# Patient Record
Sex: MALE | Race: WHITE | Employment: OTHER | URBAN - METROPOLITAN AREA
[De-identification: names, ages, dates, MRNs, and addresses within clinical notes are randomized per-mention and may not be internally consistent; named-entity substitution may affect disease eponyms.]

---

## 2017-06-09 ENCOUNTER — OFFICE VISIT (OUTPATIENT)
Dept: ORTHOPEDIC SURGERY | Age: 72
End: 2017-06-09

## 2017-06-09 DIAGNOSIS — M25.562 LEFT KNEE PAIN, UNSPECIFIED CHRONICITY: Primary | ICD-10-CM

## 2017-06-09 DIAGNOSIS — M17.12 PRIMARY OSTEOARTHRITIS OF LEFT KNEE: ICD-10-CM

## 2017-06-09 PROCEDURE — 73564 X-RAY EXAM KNEE 4 OR MORE: CPT | Performed by: PHYSICIAN ASSISTANT

## 2017-06-09 PROCEDURE — 99203 OFFICE O/P NEW LOW 30 MIN: CPT | Performed by: PHYSICIAN ASSISTANT

## 2017-06-26 ENCOUNTER — OFFICE VISIT (OUTPATIENT)
Dept: ORTHOPEDIC SURGERY | Age: 72
End: 2017-06-26

## 2017-06-26 VITALS
BODY MASS INDEX: 27.26 KG/M2 | HEIGHT: 68 IN | WEIGHT: 179.9 LBS | DIASTOLIC BLOOD PRESSURE: 73 MMHG | SYSTOLIC BLOOD PRESSURE: 103 MMHG | HEART RATE: 83 BPM

## 2017-06-26 DIAGNOSIS — M25.562 LEFT KNEE PAIN, UNSPECIFIED CHRONICITY: Primary | ICD-10-CM

## 2017-06-26 PROCEDURE — 99214 OFFICE O/P EST MOD 30 MIN: CPT | Performed by: ORTHOPAEDIC SURGERY

## 2017-06-26 RX ORDER — ROSUVASTATIN CALCIUM 40 MG/1
40 TABLET, COATED ORAL
COMMUNITY
Start: 2015-04-14

## 2018-11-16 ENCOUNTER — HOSPITAL ENCOUNTER (OUTPATIENT)
Age: 73
Discharge: HOME | End: 2018-11-16
Payer: MEDICARE

## 2018-11-16 ENCOUNTER — HOSPITAL ENCOUNTER (OUTPATIENT)
Dept: GENERAL RADIOLOGY | Age: 73
Discharge: HOME | End: 2018-11-16
Payer: MEDICARE

## 2018-11-16 DIAGNOSIS — M54.5 LOW BACK PAIN, UNSPECIFIED BACK PAIN LATERALITY, UNSPECIFIED CHRONICITY, WITH SCIATICA PRESENCE UNSPECIFIED: ICD-10-CM

## 2018-11-16 DIAGNOSIS — M25.551 RIGHT HIP PAIN: ICD-10-CM

## 2018-11-16 PROCEDURE — 72100 X-RAY EXAM L-S SPINE 2/3 VWS: CPT

## 2018-11-16 PROCEDURE — 73502 X-RAY EXAM HIP UNI 2-3 VIEWS: CPT

## 2024-11-15 ENCOUNTER — TELEPHONE (OUTPATIENT)
Dept: INTERVENTIONAL RADIOLOGY/VASCULAR | Age: 79
End: 2024-11-15

## 2024-11-15 NOTE — TELEPHONE ENCOUNTER
Called and spoke to Nichole about upcoming procedure. Phone number used: [phone]  Procedure: tunneled hemodialysis catheter placement  Approving Radiologist: not needed    Pt informed of the following:  Date of procedure: 11/21/24  Arrival time of procedure: 0930  Time of procedure: 1130    On any blood thinners?Yes. If yes: Pradaxa  Instructed to hold thinners for 0 days prior to test    On any GLP-1 Agonists? ( Ozempic, Jardiance, Semaglutide) Yes. Instructed to hold for 7 days.     Blood pressure medication? No. If yes need to make sure take morning of procedure.     Any issues with sedation in the past? no  Will receive versed and fentanyl for sedation will need a  day of procedure.     H&P or office note within 30 days? no    After procedure will be here 2-4 hours after procedure for monitoring.     Nothing to eat or drink at midnight.     Need COVID testing prior: No    Need SDS: Yes    Procedure: tunneled hemodialysis catheter removal  Approving Radiologist: not needed    Pt informed of the following:  Date of procedure: 12/20/24  Arrival time of procedure: 1230  Time of procedure: 1300      Need SDS: No

## 2024-11-21 ENCOUNTER — HOSPITAL ENCOUNTER (OUTPATIENT)
Dept: INTERVENTIONAL RADIOLOGY/VASCULAR | Age: 79
Discharge: HOME | End: 2024-11-21
Attending: UROLOGY
Payer: MEDICARE

## 2024-11-21 VITALS
DIASTOLIC BLOOD PRESSURE: 69 MMHG | OXYGEN SATURATION: 93 % | RESPIRATION RATE: 16 BRPM | HEART RATE: 64 BPM | TEMPERATURE: 97.7 F | SYSTOLIC BLOOD PRESSURE: 109 MMHG

## 2024-11-21 DIAGNOSIS — C61 MALIGNANT NEOPLASM OF PROSTATE (HCC): ICD-10-CM

## 2024-11-21 LAB
ANION GAP SERPL CALCULATED.3IONS-SCNC: 14 MMOL/L (ref 3–16)
BASOPHILS # BLD: 0.1 K/UL (ref 0–0.2)
BASOPHILS NFR BLD: 0.7 %
BUN SERPL-MCNC: 11 MG/DL (ref 7–20)
CALCIUM SERPL-MCNC: 9.7 MG/DL (ref 8.3–10.6)
CHLORIDE SERPL-SCNC: 105 MMOL/L (ref 99–110)
CO2 SERPL-SCNC: 21 MMOL/L (ref 21–32)
CREAT SERPL-MCNC: 0.7 MG/DL (ref 0.8–1.3)
DEPRECATED RDW RBC AUTO: 13.2 % (ref 12.4–15.4)
EOSINOPHIL # BLD: 0.3 K/UL (ref 0–0.6)
EOSINOPHIL NFR BLD: 3.1 %
GFR SERPLBLD CREATININE-BSD FMLA CKD-EPI: >90 ML/MIN/{1.73_M2}
GLUCOSE SERPL-MCNC: 129 MG/DL (ref 70–99)
HCT VFR BLD AUTO: 45.1 % (ref 40.5–52.5)
HGB BLD-MCNC: 15.4 G/DL (ref 13.5–17.5)
INR PPP: 1.03 (ref 0.85–1.15)
LYMPHOCYTES # BLD: 2 K/UL (ref 1–5.1)
LYMPHOCYTES NFR BLD: 21.9 %
MCH RBC QN AUTO: 30.6 PG (ref 26–34)
MCHC RBC AUTO-ENTMCNC: 34.2 G/DL (ref 31–36)
MCV RBC AUTO: 89.3 FL (ref 80–100)
MONOCYTES # BLD: 0.8 K/UL (ref 0–1.3)
MONOCYTES NFR BLD: 8.6 %
NEUTROPHILS # BLD: 6 K/UL (ref 1.7–7.7)
NEUTROPHILS NFR BLD: 65.7 %
PLATELET # BLD AUTO: 218 K/UL (ref 135–450)
PMV BLD AUTO: 7.7 FL (ref 5–10.5)
POTASSIUM SERPL-SCNC: 3.8 MMOL/L (ref 3.5–5.1)
PROTHROMBIN TIME: 13.7 SEC (ref 11.9–14.9)
RBC # BLD AUTO: 5.04 M/UL (ref 4.2–5.9)
SODIUM SERPL-SCNC: 140 MMOL/L (ref 136–145)
WBC # BLD AUTO: 9.1 K/UL (ref 4–11)

## 2024-11-21 PROCEDURE — 99152 MOD SED SAME PHYS/QHP 5/>YRS: CPT

## 2024-11-21 PROCEDURE — 85025 COMPLETE CBC W/AUTO DIFF WBC: CPT

## 2024-11-21 PROCEDURE — 2500000003 HC RX 250 WO HCPCS: Performed by: RADIOLOGY

## 2024-11-21 PROCEDURE — 76937 US GUIDE VASCULAR ACCESS: CPT

## 2024-11-21 PROCEDURE — 85610 PROTHROMBIN TIME: CPT

## 2024-11-21 PROCEDURE — 36558 INSERT TUNNELED CV CATH: CPT

## 2024-11-21 PROCEDURE — 2580000003 HC RX 258: Performed by: RADIOLOGY

## 2024-11-21 PROCEDURE — 77001 FLUOROGUIDE FOR VEIN DEVICE: CPT

## 2024-11-21 PROCEDURE — 36415 COLL VENOUS BLD VENIPUNCTURE: CPT

## 2024-11-21 PROCEDURE — 6360000002 HC RX W HCPCS: Performed by: RADIOLOGY

## 2024-11-21 PROCEDURE — C1750 CATH, HEMODIALYSIS,LONG-TERM: HCPCS

## 2024-11-21 PROCEDURE — 80048 BASIC METABOLIC PNL TOTAL CA: CPT

## 2024-11-21 RX ORDER — SODIUM CHLORIDE 0.9 % (FLUSH) 0.9 %
5-40 SYRINGE (ML) INJECTION PRN
Status: DISCONTINUED | OUTPATIENT
Start: 2024-11-21 | End: 2024-11-22 | Stop reason: HOSPADM

## 2024-11-21 RX ORDER — FENTANYL CITRATE 0.05 MG/ML
INJECTION, SOLUTION INTRAMUSCULAR; INTRAVENOUS PRN
Status: COMPLETED | OUTPATIENT
Start: 2024-11-21 | End: 2024-11-21

## 2024-11-21 RX ORDER — SODIUM CHLORIDE 9 MG/ML
INJECTION, SOLUTION INTRAVENOUS PRN
Status: DISCONTINUED | OUTPATIENT
Start: 2024-11-21 | End: 2024-11-22 | Stop reason: HOSPADM

## 2024-11-21 RX ORDER — MIDAZOLAM HYDROCHLORIDE 1 MG/ML
INJECTION, SOLUTION INTRAMUSCULAR; INTRAVENOUS PRN
Status: COMPLETED | OUTPATIENT
Start: 2024-11-21 | End: 2024-11-21

## 2024-11-21 RX ORDER — HEPARIN SODIUM 1000 [USP'U]/ML
INJECTION, SOLUTION INTRAVENOUS; SUBCUTANEOUS PRN
Status: COMPLETED | OUTPATIENT
Start: 2024-11-21 | End: 2024-11-21

## 2024-11-21 RX ORDER — SODIUM CHLORIDE 0.9 % (FLUSH) 0.9 %
5-40 SYRINGE (ML) INJECTION EVERY 12 HOURS SCHEDULED
Status: DISCONTINUED | OUTPATIENT
Start: 2024-11-21 | End: 2024-11-22 | Stop reason: HOSPADM

## 2024-11-21 RX ORDER — LIDOCAINE HYDROCHLORIDE AND EPINEPHRINE BITARTRATE 20; .01 MG/ML; MG/ML
INJECTION, SOLUTION SUBCUTANEOUS PRN
Status: COMPLETED | OUTPATIENT
Start: 2024-11-21 | End: 2024-11-21

## 2024-11-21 RX ORDER — LIDOCAINE HYDROCHLORIDE 10 MG/ML
INJECTION, SOLUTION EPIDURAL; INFILTRATION; INTRACAUDAL; PERINEURAL PRN
Status: COMPLETED | OUTPATIENT
Start: 2024-11-21 | End: 2024-11-21

## 2024-11-21 RX ADMIN — CEFAZOLIN 2000 MG: 2 INJECTION, POWDER, FOR SOLUTION INTRAVENOUS at 11:25

## 2024-11-21 RX ADMIN — LIDOCAINE HYDROCHLORIDE AND EPINEPHRINE 5 ML: 20; 10 INJECTION, SOLUTION INFILTRATION; PERINEURAL at 11:44

## 2024-11-21 RX ADMIN — HEPARIN SODIUM 3200 UNITS: 1000 INJECTION INTRAVENOUS; SUBCUTANEOUS at 11:46

## 2024-11-21 RX ADMIN — FENTANYL CITRATE 50 MCG: 50 INJECTION INTRAMUSCULAR; INTRAVENOUS at 11:35

## 2024-11-21 RX ADMIN — MIDAZOLAM 1 MG: 1 INJECTION INTRAMUSCULAR; INTRAVENOUS at 11:35

## 2024-11-21 RX ADMIN — LIDOCAINE HYDROCHLORIDE 5 ML: 10 INJECTION, SOLUTION EPIDURAL; INFILTRATION; INTRACAUDAL; PERINEURAL at 11:44

## 2024-11-21 RX ADMIN — MIDAZOLAM 1 MG: 1 INJECTION INTRAMUSCULAR; INTRAVENOUS at 11:42

## 2024-11-21 RX ADMIN — FENTANYL CITRATE 50 MCG: 50 INJECTION INTRAMUSCULAR; INTRAVENOUS at 11:42

## 2024-11-21 ASSESSMENT — PAIN - FUNCTIONAL ASSESSMENT
PAIN_FUNCTIONAL_ASSESSMENT: 0-10
PAIN_FUNCTIONAL_ASSESSMENT: NONE - DENIES PAIN
PAIN_FUNCTIONAL_ASSESSMENT: 0-10

## 2024-11-21 NOTE — PROGRESS NOTES
Patient admitted to preop bay 1. Consents verified. Patient NPO since 1800. Patient belongings to remain on PACU cart for procedure.

## 2024-11-21 NOTE — OR NURSING
Image guided tunneled dialysis catheter insertion right IJ completed. Dr. Rai placed 14.5 [place] 19 cm [employer] tunneled dialysis catheter LOT # [id number] EXP 03/31/2026 in the right IJ. Drain/tube secured with suture. Drain/tube dressing clean, dry, and intact. Pt tolerated procedure without any signs or symptoms of distress. Vital signs stable. Report called to [facility] RN. Pt transported back to [facility] in stable condition via bed by transport.     Medications  Versed: 2 mg  Fentanyl: 100 mcg    Vital Signs  Vitals:    11/21/24 1149   BP: 122/71   Pulse: 65   Resp: 12   Temp:    SpO2: 95%    (vital signs in table format)    Post Alexsandra  2 - Able to move 4 extremities voluntarily on command  2 - BP+/- 20mmHg of normal  2 - Fully awake  2 - Able to maintain oxygen saturation >92% on room air  2 - Able to breathe deeply and cough freely

## 2024-11-21 NOTE — PRE SEDATION
Sedation Pre-Procedure Note    Patient Name: Dionisio Child   YOB: 1945  Room/Bed: Room/bed info not found  Medical Record Number: [id number]  Date: 11/21/2024   Time: 10:53 AM       Indication:  Tunneled Dialysis/Pheresis Catheter Placement    Consent: I have discussed with the patient and/or the patient representative the indication, alternatives, and the possible risks and/or complications of the planned procedure and the anesthesia methods. The patient and/or patient representative appear to understand and agree to proceed.    Vital Signs:   Vitals:    11/21/24 1010   BP: (!) 144/82   Pulse: 71   Resp: 16   Temp: 97.7 °F (36.5 °C)   SpO2: 94%       Past Medical History:   has a past medical history of Cancer (HCC), Irregular heart rhythm, and Unspecified cerebral artery occlusion with cerebral infarction.    Past Surgical History:   has a past surgical history that includes Pacemaker insertion; Prostate biopsy; Cardiac catheterization; and cyst removal (Right, 4/17/15).    Medications:   Scheduled Meds:    sodium chloride flush  5-40 mL IntraVENous 2 times per day     Continuous Infusions:    sodium chloride       PRN Meds: sodium chloride flush, sodium chloride  Home Meds:   Prior to Admission medications    Medication Sig Start Date End Date Taking? Authorizing Provider   rosuvastatin (CRESTOR) 40 MG tablet Take 1 tablet by mouth 4/14/15  Yes Serg Edward MD   rosuvastatin (CRESTOR) 40 MG tablet Take 1 tablet by mouth every evening   Yes Serg Edward MD   eplerenone (INSPRA) 50 MG tablet Take 1 tablet by mouth daily   Yes Serg Edward MD   dabigatran (PRADAXA) 150 MG capsule Take 1 capsule by mouth 2 times daily   Yes Serg Edward MD   carvedilol (COREG) 25 MG tablet Take 1 tablet by mouth 2 times daily (with meals)    Serg Edward MD   losartan (COZAAR) 100 MG tablet Take 1 tablet by mouth daily    Serg Edward MD     Coumadin Use Last 7

## 2024-11-21 NOTE — PROGRESS NOTES
Patient and wife concerned with CVC, thought patient was getting a port placed. Phone called made down to IR to speak with Dr Rai. IR RN coming to speak with patient.

## 2024-11-21 NOTE — OR NURSING
Pt arrived for image guided tunneled dialysis catheter insertion right . Procedure explained including the risk and benefits of the procedure. All questions answered. Pt verbalizes understanding of the procedure and states no more questions. Consent confirmed. Vital signs stable. Labs, allergies, medications, and code status reviewed. No contraindications noted.     Vital Signs  Vitals:    11/21/24 1010   BP: (!) 144/82   Pulse: 71   Resp: 16   Temp: 97.7 °F (36.5 °C)   SpO2: 94%    (vital signs in table format)    Pre Alexsandra Score  2 - Able to move 4 extremities voluntarily on command  2 - BP+/- 20mmHg of normal  2 - Fully awake  2 - Able to maintain oxygen saturation >92% on room air  2 - Able to breathe deeply and cough freely    Allergies  Lipitor [atorvastatin] and Niacin and related (allergies)    Labs  Lab Results   Component Value Date    INR 1.03 11/21/2024    PROTIME 13.7 11/21/2024     Lab Results   Component Value Date    CREATININE 0.7 (L) 11/21/2024    BUN 11 11/21/2024     11/21/2024    K 3.8 11/21/2024     11/21/2024    CO2 21 11/21/2024     Lab Results   Component Value Date    WBC 9.1 11/21/2024    HGB 15.4 11/21/2024    HCT 45.1 11/21/2024    MCV 89.3 11/21/2024     11/21/2024

## 2024-11-21 NOTE — BRIEF OP NOTE
Brief Postoperative Note    Dionisio Child  YOB: 1945  [id number]    Pre-operative Diagnosis: Plasma Pheresis    Post-operative Diagnosis: Same    Procedure: Tunneled hemodialysis catheter placement    Anesthesia: Moderate Sedation    Surgeons: Jeramy Rai MD    Estimated Blood Loss: Less than 5 mL    Complications: None    Specimens: Was Not Obtained    Findings: Successful placement of a right IJ tunneled hemodialysis/pheresis catheter.    Electronically signed by Jeramy Rai MD on 11/21/2024 at 11:50 AM

## 2024-12-20 ENCOUNTER — HOSPITAL ENCOUNTER (OUTPATIENT)
Dept: INTERVENTIONAL RADIOLOGY/VASCULAR | Age: 79
Discharge: HOME | End: 2024-12-20
Attending: UROLOGY
Payer: MEDICARE

## 2024-12-20 VITALS
RESPIRATION RATE: 17 BRPM | DIASTOLIC BLOOD PRESSURE: 71 MMHG | SYSTOLIC BLOOD PRESSURE: 141 MMHG | OXYGEN SATURATION: 94 % | HEART RATE: 68 BPM

## 2024-12-20 DIAGNOSIS — C61 MALIGNANT NEOPLASM OF PROSTATE (HCC): ICD-10-CM

## 2024-12-20 PROCEDURE — 77001 FLUOROGUIDE FOR VEIN DEVICE: CPT

## 2024-12-20 PROCEDURE — 6360000002 HC RX W HCPCS: Performed by: RADIOLOGY

## 2024-12-20 PROCEDURE — 36589 REMOVAL TUNNELED CV CATH: CPT

## 2024-12-20 RX ORDER — LIDOCAINE HYDROCHLORIDE 20 MG/ML
INJECTION, SOLUTION EPIDURAL; INFILTRATION; INTRACAUDAL; PERINEURAL PRN
Status: COMPLETED | OUTPATIENT
Start: 2024-12-20 | End: 2024-12-20

## 2024-12-20 RX ADMIN — LIDOCAINE HYDROCHLORIDE 9 ML: 20 INJECTION, SOLUTION EPIDURAL; INFILTRATION; INTRACAUDAL; PERINEURAL at 12:19

## 2024-12-20 RX ADMIN — LIDOCAINE HYDROCHLORIDE 8 ML: 20 INJECTION, SOLUTION EPIDURAL; INFILTRATION; INTRACAUDAL; PERINEURAL at 12:23

## 2024-12-20 NOTE — OR NURSING
Image guided tunneled dialysis catheter removal completed. Pt tolerated procedural well. Catheter tip intact. Area closed with skin glue and bandage. Pt ambulated to vascular waiting area to family.

## 2024-12-20 NOTE — OR NURSING
Pt arrived for image guided tunneled dialysis catheter removal. Procedure explained including the risk and benefits of the procedure. All questions answered. Pt verbalizes understanding of the of procedure and states no more questions. Consent signed. Vital signs stable, labs, allergies, medications, and code status reviewed. No contraindications noted.     Vitals:    12/20/24 1218   BP: (!) 145/75   Pulse: 72   Resp: 18   SpO2: 95%    (vital signs in table format)      Allergies  Lipitor [atorvastatin] and Niacin and related    Labs  Lab Results   Component Value Date    INR 1.03 11/21/2024    PROTIME 13.7 11/21/2024       Lab Results   Component Value Date    CREATININE 0.7 (L) 11/21/2024    BUN 11 11/21/2024     11/21/2024    K 3.8 11/21/2024     11/21/2024    CO2 21 11/21/2024       Lab Results   Component Value Date    WBC 9.1 11/21/2024    HGB 15.4 11/21/2024    HCT 45.1 11/21/2024    MCV 89.3 11/21/2024     11/21/2024

## 2024-12-26 ENCOUNTER — TELEPHONE (OUTPATIENT)
Dept: INTERVENTIONAL RADIOLOGY/VASCULAR | Age: 79
End: 2024-12-26

## 2024-12-26 NOTE — TELEPHONE ENCOUNTER
Dionisio contacted post Tunneled Cath removal procedure using phone number [phone].     How are you feeling following your procedure? Pt states is feeling fine!    Do you have any questions or concerns? Pt asked if bandage could be removed as well as when he is able to start exercising. Told patient that he is able to removal bandage and can start doing what he feels comfortable with.     All patient questions and concerns addressed.    Patient instructed to contact Interventional Radiology at [phone] if they had any further questions.    Thank you for allowing us to be a part of your care!

## 2025-05-25 ENCOUNTER — APPOINTMENT (OUTPATIENT)
Dept: CT IMAGING | Age: 80
DRG: 377 | End: 2025-05-25
Payer: OTHER GOVERNMENT

## 2025-05-25 ENCOUNTER — HOSPITAL ENCOUNTER (INPATIENT)
Age: 80
LOS: 8 days | Discharge: HOME HEALTH | DRG: 377 | End: 2025-06-02
Attending: EMERGENCY MEDICINE | Admitting: INTERNAL MEDICINE
Payer: OTHER GOVERNMENT

## 2025-05-25 DIAGNOSIS — R06.02 SHORTNESS OF BREATH: ICD-10-CM

## 2025-05-25 DIAGNOSIS — D64.9 ANEMIA, UNSPECIFIED TYPE: ICD-10-CM

## 2025-05-25 DIAGNOSIS — R62.7 FAILURE TO THRIVE IN ADULT: ICD-10-CM

## 2025-05-25 DIAGNOSIS — R53.1 GENERAL WEAKNESS: ICD-10-CM

## 2025-05-25 DIAGNOSIS — E87.6 HYPOKALEMIA: ICD-10-CM

## 2025-05-25 DIAGNOSIS — E86.1 HYPOTENSION DUE TO HYPOVOLEMIA: ICD-10-CM

## 2025-05-25 DIAGNOSIS — R41.82 ALTERED MENTAL STATUS, UNSPECIFIED ALTERED MENTAL STATUS TYPE: ICD-10-CM

## 2025-05-25 DIAGNOSIS — K92.2 GASTROINTESTINAL HEMORRHAGE, UNSPECIFIED GASTROINTESTINAL HEMORRHAGE TYPE: Primary | ICD-10-CM

## 2025-05-25 DIAGNOSIS — C61 PROSTATE CANCER (HCC): ICD-10-CM

## 2025-05-25 DIAGNOSIS — C79.9 METASTATIC MALIGNANT NEOPLASM, UNSPECIFIED SITE (HCC): ICD-10-CM

## 2025-05-25 DIAGNOSIS — I50.9 CONGESTIVE HEART FAILURE, UNSPECIFIED HF CHRONICITY, UNSPECIFIED HEART FAILURE TYPE (HCC): ICD-10-CM

## 2025-05-25 LAB
ABO/RH: NORMAL
ALBUMIN SERPL-MCNC: 3.2 G/DL (ref 3.4–5)
ALBUMIN/GLOB SERPL: 1.1 {RATIO} (ref 1.1–2.2)
ALP SERPL-CCNC: 334 U/L (ref 40–129)
ALT SERPL-CCNC: 14 U/L (ref 10–40)
AMMONIA PLAS-SCNC: 13 UMOL/L (ref 16–60)
ANION GAP SERPL CALCULATED.3IONS-SCNC: 21 MMOL/L (ref 3–16)
ANISOCYTOSIS BLD QL SMEAR: ABNORMAL
ANTIBODY SCREEN: NORMAL
AST SERPL-CCNC: 43 U/L (ref 15–37)
BASOPHILS # BLD: 0 K/UL (ref 0–0.2)
BASOPHILS NFR BLD: 0 %
BILIRUB SERPL-MCNC: 0.9 MG/DL (ref 0–1)
BLOOD BANK DISPENSE STATUS: NORMAL
BLOOD BANK PRODUCT CODE: NORMAL
BPU ID: NORMAL
BUN SERPL-MCNC: 25 MG/DL (ref 7–20)
CALCIUM SERPL-MCNC: 8.8 MG/DL (ref 8.3–10.6)
CHLORIDE SERPL-SCNC: 98 MMOL/L (ref 99–110)
CO2 SERPL-SCNC: 22 MMOL/L (ref 21–32)
CREAT SERPL-MCNC: 0.7 MG/DL (ref 0.8–1.3)
DEPRECATED RDW RBC AUTO: 18.4 % (ref 12.4–15.4)
DESCRIPTION BLOOD BANK: NORMAL
EOSINOPHIL # BLD: 0 K/UL (ref 0–0.6)
EOSINOPHIL NFR BLD: 0 %
FLUAV RNA RESP QL NAA+PROBE: NOT DETECTED
FLUBV RNA RESP QL NAA+PROBE: NOT DETECTED
GFR SERPLBLD CREATININE-BSD FMLA CKD-EPI: >90 ML/MIN/{1.73_M2}
GLUCOSE SERPL-MCNC: 116 MG/DL (ref 70–99)
HCT VFR BLD AUTO: 20.8 % (ref 40.5–52.5)
HEMOCCULT SP1 STL QL: ABNORMAL
HGB BLD-MCNC: 7.1 G/DL (ref 13.5–17.5)
LACTATE BLDV-SCNC: 0.9 MMOL/L (ref 0.4–1.9)
LACTATE BLDV-SCNC: 1.2 MMOL/L (ref 0.4–2)
LIPASE SERPL-CCNC: 34 U/L (ref 13–60)
LYMPHOCYTES # BLD: 1.8 K/UL (ref 1–5.1)
LYMPHOCYTES NFR BLD: 20 %
MAGNESIUM SERPL-MCNC: 1.97 MG/DL (ref 1.8–2.4)
MCH RBC QN AUTO: 28.8 PG (ref 26–34)
MCHC RBC AUTO-ENTMCNC: 33.9 G/DL (ref 31–36)
MCV RBC AUTO: 84.8 FL (ref 80–100)
METAMYELOCYTES NFR BLD MANUAL: 3 %
MICROCYTES BLD QL SMEAR: ABNORMAL
MONOCYTES # BLD: 0.6 K/UL (ref 0–1.3)
MONOCYTES NFR BLD: 8 %
MONONUC CELLS NFR BLD MANUAL: 2 %
NEUTROPHILS # BLD: 5.4 K/UL (ref 1.7–7.7)
NEUTROPHILS NFR BLD: 51 %
NEUTS BAND NFR BLD MANUAL: 14 % (ref 0–7)
NRBC BLD-RTO: 4 /100 WBC
NT-PROBNP SERPL-MCNC: 594 PG/ML (ref 0–449)
PATH INTERP BLD-IMP: YES
PLATELET # BLD AUTO: 112 K/UL (ref 135–450)
PLATELET BLD QL SMEAR: ABNORMAL
PMV BLD AUTO: 6.9 FL (ref 5–10.5)
POIKILOCYTOSIS BLD QL SMEAR: ABNORMAL
POLYCHROMASIA BLD QL SMEAR: ABNORMAL
POTASSIUM SERPL-SCNC: 2.9 MMOL/L (ref 3.5–5.1)
PROCALCITONIN SERPL IA-MCNC: 0.43 NG/ML (ref 0–0.15)
PROT SERPL-MCNC: 6.1 G/DL (ref 6.4–8.2)
RBC # BLD AUTO: 2.46 M/UL (ref 4.2–5.9)
SARS-COV-2 RNA RESP QL NAA+PROBE: NOT DETECTED
SLIDE REVIEW: ABNORMAL
SODIUM SERPL-SCNC: 141 MMOL/L (ref 136–145)
TROPONIN, HIGH SENSITIVITY: 17 NG/L (ref 0–22)
VARIANT LYMPHS NFR BLD MANUAL: 2 % (ref 0–6)
WBC # BLD AUTO: 8 K/UL (ref 4–11)

## 2025-05-25 PROCEDURE — 82270 OCCULT BLOOD FECES: CPT

## 2025-05-25 PROCEDURE — 85025 COMPLETE CBC W/AUTO DIFF WBC: CPT

## 2025-05-25 PROCEDURE — 84145 PROCALCITONIN (PCT): CPT

## 2025-05-25 PROCEDURE — 84484 ASSAY OF TROPONIN QUANT: CPT

## 2025-05-25 PROCEDURE — 83605 ASSAY OF LACTIC ACID: CPT

## 2025-05-25 PROCEDURE — 2500000003 HC RX 250 WO HCPCS: Performed by: STUDENT IN AN ORGANIZED HEALTH CARE EDUCATION/TRAINING PROGRAM

## 2025-05-25 PROCEDURE — 96361 HYDRATE IV INFUSION ADD-ON: CPT

## 2025-05-25 PROCEDURE — 87636 SARSCOV2 & INF A&B AMP PRB: CPT

## 2025-05-25 PROCEDURE — 80053 COMPREHEN METABOLIC PANEL: CPT

## 2025-05-25 PROCEDURE — 82140 ASSAY OF AMMONIA: CPT

## 2025-05-25 PROCEDURE — 6360000004 HC RX CONTRAST MEDICATION: Performed by: PHYSICIAN ASSISTANT

## 2025-05-25 PROCEDURE — 86901 BLOOD TYPING SEROLOGIC RH(D): CPT

## 2025-05-25 PROCEDURE — 6360000002 HC RX W HCPCS: Performed by: EMERGENCY MEDICINE

## 2025-05-25 PROCEDURE — 6360000002 HC RX W HCPCS: Performed by: NURSE PRACTITIONER

## 2025-05-25 PROCEDURE — 99285 EMERGENCY DEPT VISIT HI MDM: CPT

## 2025-05-25 PROCEDURE — 96374 THER/PROPH/DIAG INJ IV PUSH: CPT

## 2025-05-25 PROCEDURE — 70450 CT HEAD/BRAIN W/O DYE: CPT

## 2025-05-25 PROCEDURE — 71260 CT THORAX DX C+: CPT

## 2025-05-25 PROCEDURE — 30233N1 TRANSFUSION OF NONAUTOLOGOUS RED BLOOD CELLS INTO PERIPHERAL VEIN, PERCUTANEOUS APPROACH: ICD-10-PCS | Performed by: INTERNAL MEDICINE

## 2025-05-25 PROCEDURE — 36430 TRANSFUSION BLD/BLD COMPNT: CPT

## 2025-05-25 PROCEDURE — 86900 BLOOD TYPING SEROLOGIC ABO: CPT

## 2025-05-25 PROCEDURE — P9016 RBC LEUKOCYTES REDUCED: HCPCS

## 2025-05-25 PROCEDURE — 74177 CT ABD & PELVIS W/CONTRAST: CPT

## 2025-05-25 PROCEDURE — 93005 ELECTROCARDIOGRAM TRACING: CPT | Performed by: PHYSICIAN ASSISTANT

## 2025-05-25 PROCEDURE — 83690 ASSAY OF LIPASE: CPT

## 2025-05-25 PROCEDURE — 6360000002 HC RX W HCPCS: Performed by: STUDENT IN AN ORGANIZED HEALTH CARE EDUCATION/TRAINING PROGRAM

## 2025-05-25 PROCEDURE — 2060000000 HC ICU INTERMEDIATE R&B

## 2025-05-25 PROCEDURE — 83880 ASSAY OF NATRIURETIC PEPTIDE: CPT

## 2025-05-25 PROCEDURE — 83735 ASSAY OF MAGNESIUM: CPT

## 2025-05-25 PROCEDURE — 36415 COLL VENOUS BLD VENIPUNCTURE: CPT

## 2025-05-25 PROCEDURE — 86850 RBC ANTIBODY SCREEN: CPT

## 2025-05-25 PROCEDURE — 87040 BLOOD CULTURE FOR BACTERIA: CPT

## 2025-05-25 PROCEDURE — 86923 COMPATIBILITY TEST ELECTRIC: CPT

## 2025-05-25 PROCEDURE — 2580000003 HC RX 258: Performed by: PHYSICIAN ASSISTANT

## 2025-05-25 PROCEDURE — 6370000000 HC RX 637 (ALT 250 FOR IP): Performed by: STUDENT IN AN ORGANIZED HEALTH CARE EDUCATION/TRAINING PROGRAM

## 2025-05-25 PROCEDURE — 2580000003 HC RX 258: Performed by: STUDENT IN AN ORGANIZED HEALTH CARE EDUCATION/TRAINING PROGRAM

## 2025-05-25 PROCEDURE — 96375 TX/PRO/DX INJ NEW DRUG ADDON: CPT

## 2025-05-25 RX ORDER — SODIUM CHLORIDE 0.9 % (FLUSH) 0.9 %
5-40 SYRINGE (ML) INJECTION PRN
Status: DISCONTINUED | OUTPATIENT
Start: 2025-05-25 | End: 2025-06-02 | Stop reason: HOSPADM

## 2025-05-25 RX ORDER — SODIUM CHLORIDE 9 MG/ML
INJECTION, SOLUTION INTRAVENOUS PRN
Status: DISCONTINUED | OUTPATIENT
Start: 2025-05-25 | End: 2025-06-02 | Stop reason: HOSPADM

## 2025-05-25 RX ORDER — POTASSIUM CHLORIDE 1500 MG/1
40 TABLET, EXTENDED RELEASE ORAL PRN
Status: DISCONTINUED | OUTPATIENT
Start: 2025-05-25 | End: 2025-06-02 | Stop reason: HOSPADM

## 2025-05-25 RX ORDER — POLYETHYLENE GLYCOL 3350 17 G/17G
17 POWDER, FOR SOLUTION ORAL DAILY PRN
Status: DISCONTINUED | OUTPATIENT
Start: 2025-05-25 | End: 2025-06-02 | Stop reason: HOSPADM

## 2025-05-25 RX ORDER — ONDANSETRON 4 MG/1
4 TABLET, ORALLY DISINTEGRATING ORAL EVERY 8 HOURS PRN
Status: DISCONTINUED | OUTPATIENT
Start: 2025-05-25 | End: 2025-05-26

## 2025-05-25 RX ORDER — SODIUM CHLORIDE, SODIUM LACTATE, POTASSIUM CHLORIDE, CALCIUM CHLORIDE 600; 310; 30; 20 MG/100ML; MG/100ML; MG/100ML; MG/100ML
INJECTION, SOLUTION INTRAVENOUS CONTINUOUS
Status: DISCONTINUED | OUTPATIENT
Start: 2025-05-25 | End: 2025-05-28

## 2025-05-25 RX ORDER — ROSUVASTATIN CALCIUM 10 MG/1
40 TABLET, COATED ORAL NIGHTLY
Status: DISCONTINUED | OUTPATIENT
Start: 2025-05-25 | End: 2025-06-02 | Stop reason: HOSPADM

## 2025-05-25 RX ORDER — POTASSIUM CHLORIDE 7.45 MG/ML
10 INJECTION INTRAVENOUS
Status: DISPENSED | OUTPATIENT
Start: 2025-05-25 | End: 2025-05-25

## 2025-05-25 RX ORDER — IOPAMIDOL 755 MG/ML
75 INJECTION, SOLUTION INTRAVASCULAR
Status: COMPLETED | OUTPATIENT
Start: 2025-05-25 | End: 2025-05-25

## 2025-05-25 RX ORDER — ACETAMINOPHEN 650 MG/1
650 SUPPOSITORY RECTAL EVERY 6 HOURS PRN
Status: DISCONTINUED | OUTPATIENT
Start: 2025-05-25 | End: 2025-06-02 | Stop reason: HOSPADM

## 2025-05-25 RX ORDER — POTASSIUM CHLORIDE 7.45 MG/ML
10 INJECTION INTRAVENOUS
Status: COMPLETED | OUTPATIENT
Start: 2025-05-25 | End: 2025-05-25

## 2025-05-25 RX ORDER — ONDANSETRON 2 MG/ML
4 INJECTION INTRAMUSCULAR; INTRAVENOUS EVERY 6 HOURS PRN
Status: DISCONTINUED | OUTPATIENT
Start: 2025-05-25 | End: 2025-05-26

## 2025-05-25 RX ORDER — POTASSIUM CHLORIDE 7.45 MG/ML
10 INJECTION INTRAVENOUS PRN
Status: DISCONTINUED | OUTPATIENT
Start: 2025-05-25 | End: 2025-06-02 | Stop reason: HOSPADM

## 2025-05-25 RX ORDER — SODIUM CHLORIDE 0.9 % (FLUSH) 0.9 %
5-40 SYRINGE (ML) INJECTION EVERY 12 HOURS SCHEDULED
Status: DISCONTINUED | OUTPATIENT
Start: 2025-05-25 | End: 2025-06-02 | Stop reason: HOSPADM

## 2025-05-25 RX ORDER — 0.9 % SODIUM CHLORIDE 0.9 %
1000 INTRAVENOUS SOLUTION INTRAVENOUS ONCE
Status: COMPLETED | OUTPATIENT
Start: 2025-05-25 | End: 2025-05-25

## 2025-05-25 RX ORDER — TADALAFIL 20 MG/1
20 TABLET ORAL DAILY PRN
COMMUNITY

## 2025-05-25 RX ORDER — ACETAMINOPHEN 325 MG/1
650 TABLET ORAL EVERY 6 HOURS PRN
Status: DISCONTINUED | OUTPATIENT
Start: 2025-05-25 | End: 2025-06-02 | Stop reason: HOSPADM

## 2025-05-25 RX ORDER — PANTOPRAZOLE SODIUM 40 MG/10ML
40 INJECTION, POWDER, LYOPHILIZED, FOR SOLUTION INTRAVENOUS ONCE
Status: COMPLETED | OUTPATIENT
Start: 2025-05-25 | End: 2025-05-25

## 2025-05-25 RX ORDER — OXYCODONE AND ACETAMINOPHEN 10; 325 MG/1; MG/1
1 TABLET ORAL EVERY 4 HOURS PRN
COMMUNITY

## 2025-05-25 RX ORDER — PANTOPRAZOLE SODIUM 40 MG/10ML
40 INJECTION, POWDER, LYOPHILIZED, FOR SOLUTION INTRAVENOUS 2 TIMES DAILY
Status: DISCONTINUED | OUTPATIENT
Start: 2025-05-25 | End: 2025-05-28

## 2025-05-25 RX ORDER — ESCITALOPRAM OXALATE 10 MG/1
10 TABLET ORAL DAILY
COMMUNITY

## 2025-05-25 RX ORDER — SODIUM CHLORIDE 9 MG/ML
INJECTION, SOLUTION INTRAVENOUS PRN
Status: COMPLETED | OUTPATIENT
Start: 2025-05-25 | End: 2025-05-28

## 2025-05-25 RX ORDER — MAGNESIUM SULFATE IN WATER 40 MG/ML
2000 INJECTION, SOLUTION INTRAVENOUS PRN
Status: DISCONTINUED | OUTPATIENT
Start: 2025-05-25 | End: 2025-06-02 | Stop reason: HOSPADM

## 2025-05-25 RX ADMIN — POTASSIUM CHLORIDE 10 MEQ: 7.46 INJECTION, SOLUTION INTRAVENOUS at 23:05

## 2025-05-25 RX ADMIN — ACETAMINOPHEN 650 MG: 325 TABLET ORAL at 22:00

## 2025-05-25 RX ADMIN — PANTOPRAZOLE SODIUM 40 MG: 40 INJECTION, POWDER, FOR SOLUTION INTRAVENOUS at 18:39

## 2025-05-25 RX ADMIN — PANTOPRAZOLE SODIUM 40 MG: 40 INJECTION, POWDER, FOR SOLUTION INTRAVENOUS at 21:14

## 2025-05-25 RX ADMIN — SODIUM CHLORIDE, SODIUM LACTATE, POTASSIUM CHLORIDE, AND CALCIUM CHLORIDE: .6; .31; .03; .02 INJECTION, SOLUTION INTRAVENOUS at 21:12

## 2025-05-25 RX ADMIN — Medication 10 ML: at 20:47

## 2025-05-25 RX ADMIN — POTASSIUM CHLORIDE 10 MEQ: 7.46 INJECTION, SOLUTION INTRAVENOUS at 18:43

## 2025-05-25 RX ADMIN — POTASSIUM CHLORIDE 10 MEQ: 7.46 INJECTION, SOLUTION INTRAVENOUS at 22:03

## 2025-05-25 RX ADMIN — IOPAMIDOL 75 ML: 755 INJECTION, SOLUTION INTRAVENOUS at 18:26

## 2025-05-25 RX ADMIN — POTASSIUM CHLORIDE 10 MEQ: 7.46 INJECTION, SOLUTION INTRAVENOUS at 20:46

## 2025-05-25 RX ADMIN — SODIUM CHLORIDE 1000 ML: 9 INJECTION, SOLUTION INTRAVENOUS at 16:56

## 2025-05-25 ASSESSMENT — LIFESTYLE VARIABLES
HOW OFTEN DO YOU HAVE A DRINK CONTAINING ALCOHOL: NEVER
HOW MANY STANDARD DRINKS CONTAINING ALCOHOL DO YOU HAVE ON A TYPICAL DAY: PATIENT DOES NOT DRINK

## 2025-05-25 ASSESSMENT — PAIN SCALES - GENERAL
PAINLEVEL_OUTOF10: 0
PAINLEVEL_OUTOF10: 0

## 2025-05-25 ASSESSMENT — PAIN - FUNCTIONAL ASSESSMENT: PAIN_FUNCTIONAL_ASSESSMENT: 0-10

## 2025-05-25 NOTE — ED NOTES
Dionisio Child is a 79 y.o. male admitted for  Principal Problem:    GIB (gastrointestinal bleeding)  Resolved Problems:    * No resolved hospital problems. *  .   Patient Home   Chief Complaint   Patient presents with    Fatigue    Weight Loss    Altered Mental Status     Dx with prostate ca in 2007. Had treatment. Follow up Pet scan this January showed metastatic disease and was started back on a chemo pill. Due to start radiation on June 4th. Pt has since lost 40 # since January and has stopped eating and become lethargic and confused at times.   .  Patient is alert and Person, Place,and Situation - unable to recall correct year / president  Patient's baseline mobility: Baseline Mobility: Cane   Code Status: No Order   Cardiac Rhythm:       Is patient on baseline Oxygen: no how many Liters2:   Abnormal Assessment Findings: stable on NC O2    Isolation: None      NIH Score:    C-SSRS: Risk of Suicide: No Risk  Bedside swallow:        Active LDA's:   Peripheral IV 05/25/25 Right Antecubital (Active)       Peripheral IV 05/25/25 Left Antecubital (Active)   Site Assessment Clean, dry & intact 05/25/25 1747   Line Status Blood return noted;Specimen collected;Flushed 05/25/25 1747     Patient admitted with a palacio: no  Patient admitted with Central Line:  NA .        Family/Caregiver Present yes Any Concerns: no   Restraints no  Sitter no         Vitals: MEWS Score: 2    Vitals:    05/25/25 1638 05/25/25 1708 05/25/25 1739 05/25/25 1846   BP: (!) 85/48 (!) 89/40 (!) 87/43 (!) 90/42   Pulse: 72 73 74 79   Resp: 12 15 15 17   Temp: 98.2 °F (36.8 °C)      TempSrc: Oral      SpO2: 96% 95% 99%    Weight: 68.5 kg (151 lb)      Height: 1.753 m (5' 9\")          Last documented pain score (0-10 scale) Pain Level: 0  Pain medication administered No.    Pertinent or High Risk Medications/Drips: Yes- see MAR.    Pending Blood Product Administration: yes - type and screen in process, consents signed    Abnormal labs:   Abnormal

## 2025-05-25 NOTE — CONSENT
Informed Consent for Blood Component Transfusion Note    I have discussed with the patient the rationale for blood component transfusion; its benefits in treating or preventing fatigue, organ damage, or death; and its risk which includes mild transfusion reactions, rare risk of blood borne infection, or more serious but rare reactions. I have discussed the alternatives to transfusion, including the risk and consequences of not receiving transfusion. The patient had an opportunity to ask questions and had agreed to proceed with transfusion of blood components.    Electronically signed by AMY PAGAN DO on 5/25/25 at 5:43 PM EDT

## 2025-05-26 LAB
ANION GAP SERPL CALCULATED.3IONS-SCNC: 23 MMOL/L (ref 3–16)
BILIRUB UR QL STRIP.AUTO: ABNORMAL
BLOOD BANK DISPENSE STATUS: NORMAL
BLOOD BANK PRODUCT CODE: NORMAL
BPU ID: NORMAL
BUN SERPL-MCNC: 14 MG/DL (ref 7–20)
CALCIUM SERPL-MCNC: 8.1 MG/DL (ref 8.3–10.6)
CHLORIDE SERPL-SCNC: 104 MMOL/L (ref 99–110)
CLARITY UR: CLEAR
CO2 SERPL-SCNC: 15 MMOL/L (ref 21–32)
COLOR UR: YELLOW
CREAT SERPL-MCNC: 0.6 MG/DL (ref 0.8–1.3)
DEPRECATED RDW RBC AUTO: 16.8 % (ref 12.4–15.4)
DEPRECATED RDW RBC AUTO: 17.2 % (ref 12.4–15.4)
DESCRIPTION BLOOD BANK: NORMAL
EPI CELLS #/AREA URNS HPF: NORMAL /HPF (ref 0–5)
FERRITIN SERPL IA-MCNC: 1497 NG/ML (ref 30–400)
GFR SERPLBLD CREATININE-BSD FMLA CKD-EPI: >90 ML/MIN/{1.73_M2}
GLUCOSE SERPL-MCNC: 94 MG/DL (ref 70–99)
GLUCOSE UR STRIP.AUTO-MCNC: 500 MG/DL
HCT VFR BLD AUTO: 20.4 % (ref 40.5–52.5)
HCT VFR BLD AUTO: 23.1 % (ref 40.5–52.5)
HCT VFR BLD AUTO: 23.2 % (ref 40.5–52.5)
HCT VFR BLD AUTO: 25.2 % (ref 40.5–52.5)
HGB BLD-MCNC: 6.7 G/DL (ref 13.5–17.5)
HGB BLD-MCNC: 7.7 G/DL (ref 13.5–17.5)
HGB BLD-MCNC: 7.7 G/DL (ref 13.5–17.5)
HGB BLD-MCNC: 8.6 G/DL (ref 13.5–17.5)
HGB UR QL STRIP.AUTO: ABNORMAL
IRON SATN MFR SERPL: 76 % (ref 20–50)
IRON SERPL-MCNC: 109 UG/DL (ref 59–158)
KETONES UR STRIP.AUTO-MCNC: 40 MG/DL
LEUKOCYTE ESTERASE UR QL STRIP.AUTO: NEGATIVE
MAGNESIUM SERPL-MCNC: 1.82 MG/DL (ref 1.8–2.4)
MCH RBC QN AUTO: 28.6 PG (ref 26–34)
MCH RBC QN AUTO: 28.8 PG (ref 26–34)
MCHC RBC AUTO-ENTMCNC: 33.4 G/DL (ref 31–36)
MCHC RBC AUTO-ENTMCNC: 33.5 G/DL (ref 31–36)
MCV RBC AUTO: 85.5 FL (ref 80–100)
MCV RBC AUTO: 86.2 FL (ref 80–100)
NITRITE UR QL STRIP.AUTO: NEGATIVE
PATH INTERP BLD-IMP: NO
PH UR STRIP.AUTO: 6 [PH] (ref 5–8)
PHOSPHATE SERPL-MCNC: 2.3 MG/DL (ref 2.5–4.9)
PLATELET # BLD AUTO: 77 K/UL (ref 135–450)
PLATELET # BLD AUTO: 86 K/UL (ref 135–450)
PLATELET BLD QL SMEAR: ABNORMAL
PMV BLD AUTO: 6.9 FL (ref 5–10.5)
PMV BLD AUTO: 7 FL (ref 5–10.5)
POTASSIUM SERPL-SCNC: 3.1 MMOL/L (ref 3.5–5.1)
PROT UR STRIP.AUTO-MCNC: NEGATIVE MG/DL
RBC # BLD AUTO: 2.69 M/UL (ref 4.2–5.9)
RBC # BLD AUTO: 2.7 M/UL (ref 4.2–5.9)
RBC #/AREA URNS HPF: NORMAL /HPF (ref 0–4)
SLIDE REVIEW: ABNORMAL
SODIUM SERPL-SCNC: 142 MMOL/L (ref 136–145)
SP GR UR STRIP.AUTO: 1.01 (ref 1–1.03)
TIBC SERPL-MCNC: 144 UG/DL (ref 260–445)
UA COMPLETE W REFLEX CULTURE PNL UR: ABNORMAL
UA DIPSTICK W REFLEX MICRO PNL UR: YES
URN SPEC COLLECT METH UR: ABNORMAL
UROBILINOGEN UR STRIP-ACNC: 1 E.U./DL
WBC # BLD AUTO: 8.5 K/UL (ref 4–11)
WBC # BLD AUTO: 8.7 K/UL (ref 4–11)
WBC #/AREA URNS HPF: NORMAL /HPF (ref 0–5)

## 2025-05-26 PROCEDURE — 94761 N-INVAS EAR/PLS OXIMETRY MLT: CPT

## 2025-05-26 PROCEDURE — 6370000000 HC RX 637 (ALT 250 FOR IP): Performed by: STUDENT IN AN ORGANIZED HEALTH CARE EDUCATION/TRAINING PROGRAM

## 2025-05-26 PROCEDURE — 80048 BASIC METABOLIC PNL TOTAL CA: CPT

## 2025-05-26 PROCEDURE — 2500000003 HC RX 250 WO HCPCS: Performed by: NURSE PRACTITIONER

## 2025-05-26 PROCEDURE — 36430 TRANSFUSION BLD/BLD COMPNT: CPT

## 2025-05-26 PROCEDURE — 6360000002 HC RX W HCPCS: Performed by: STUDENT IN AN ORGANIZED HEALTH CARE EDUCATION/TRAINING PROGRAM

## 2025-05-26 PROCEDURE — 36415 COLL VENOUS BLD VENIPUNCTURE: CPT

## 2025-05-26 PROCEDURE — P9045 ALBUMIN (HUMAN), 5%, 250 ML: HCPCS | Performed by: NURSE PRACTITIONER

## 2025-05-26 PROCEDURE — 85018 HEMOGLOBIN: CPT

## 2025-05-26 PROCEDURE — 83550 IRON BINDING TEST: CPT

## 2025-05-26 PROCEDURE — 2580000003 HC RX 258: Performed by: STUDENT IN AN ORGANIZED HEALTH CARE EDUCATION/TRAINING PROGRAM

## 2025-05-26 PROCEDURE — 83540 ASSAY OF IRON: CPT

## 2025-05-26 PROCEDURE — 83735 ASSAY OF MAGNESIUM: CPT

## 2025-05-26 PROCEDURE — 92610 EVALUATE SWALLOWING FUNCTION: CPT

## 2025-05-26 PROCEDURE — 82728 ASSAY OF FERRITIN: CPT

## 2025-05-26 PROCEDURE — 2060000000 HC ICU INTERMEDIATE R&B

## 2025-05-26 PROCEDURE — 6370000000 HC RX 637 (ALT 250 FOR IP): Performed by: NURSE PRACTITIONER

## 2025-05-26 PROCEDURE — 2700000000 HC OXYGEN THERAPY PER DAY

## 2025-05-26 PROCEDURE — 84100 ASSAY OF PHOSPHORUS: CPT

## 2025-05-26 PROCEDURE — 6360000002 HC RX W HCPCS: Performed by: NURSE PRACTITIONER

## 2025-05-26 PROCEDURE — 85027 COMPLETE CBC AUTOMATED: CPT

## 2025-05-26 PROCEDURE — 85014 HEMATOCRIT: CPT

## 2025-05-26 PROCEDURE — 2580000003 HC RX 258: Performed by: NURSE PRACTITIONER

## 2025-05-26 PROCEDURE — 2500000003 HC RX 250 WO HCPCS: Performed by: STUDENT IN AN ORGANIZED HEALTH CARE EDUCATION/TRAINING PROGRAM

## 2025-05-26 PROCEDURE — 81001 URINALYSIS AUTO W/SCOPE: CPT

## 2025-05-26 RX ORDER — PROCHLORPERAZINE MALEATE 5 MG/1
5 TABLET ORAL EVERY 6 HOURS PRN
Status: DISCONTINUED | OUTPATIENT
Start: 2025-05-26 | End: 2025-06-02 | Stop reason: HOSPADM

## 2025-05-26 RX ORDER — SODIUM CHLORIDE 9 MG/ML
INJECTION, SOLUTION INTRAVENOUS PRN
Status: DISCONTINUED | OUTPATIENT
Start: 2025-05-26 | End: 2025-06-02 | Stop reason: HOSPADM

## 2025-05-26 RX ORDER — PROCHLORPERAZINE EDISYLATE 5 MG/ML
5 INJECTION INTRAMUSCULAR; INTRAVENOUS EVERY 6 HOURS PRN
Status: DISCONTINUED | OUTPATIENT
Start: 2025-05-26 | End: 2025-06-02 | Stop reason: HOSPADM

## 2025-05-26 RX ORDER — ALBUMIN HUMAN 50 G/1000ML
25 SOLUTION INTRAVENOUS ONCE
Status: COMPLETED | OUTPATIENT
Start: 2025-05-26 | End: 2025-05-26

## 2025-05-26 RX ORDER — ESCITALOPRAM OXALATE 10 MG/1
10 TABLET ORAL DAILY
Status: DISCONTINUED | OUTPATIENT
Start: 2025-05-26 | End: 2025-06-02 | Stop reason: HOSPADM

## 2025-05-26 RX ORDER — OXYCODONE AND ACETAMINOPHEN 10; 325 MG/1; MG/1
1 TABLET ORAL EVERY 4 HOURS PRN
Refills: 0 | Status: DISCONTINUED | OUTPATIENT
Start: 2025-05-26 | End: 2025-06-02 | Stop reason: HOSPADM

## 2025-05-26 RX ADMIN — OXYCODONE AND ACETAMINOPHEN 1 TABLET: 325; 10 TABLET ORAL at 15:55

## 2025-05-26 RX ADMIN — PANTOPRAZOLE SODIUM 40 MG: 40 INJECTION, POWDER, FOR SOLUTION INTRAVENOUS at 08:40

## 2025-05-26 RX ADMIN — SODIUM CHLORIDE, SODIUM LACTATE, POTASSIUM CHLORIDE, AND CALCIUM CHLORIDE: .6; .31; .03; .02 INJECTION, SOLUTION INTRAVENOUS at 22:46

## 2025-05-26 RX ADMIN — Medication 10 ML: at 20:26

## 2025-05-26 RX ADMIN — PANTOPRAZOLE SODIUM 40 MG: 40 INJECTION, POWDER, FOR SOLUTION INTRAVENOUS at 20:20

## 2025-05-26 RX ADMIN — ESCITALOPRAM OXALATE 10 MG: 10 TABLET ORAL at 14:38

## 2025-05-26 RX ADMIN — POTASSIUM PHOSPHATE, MONOBASIC POTASSIUM PHOSPHATE, DIBASIC 30 MMOL: 224; 236 INJECTION, SOLUTION, CONCENTRATE INTRAVENOUS at 15:54

## 2025-05-26 RX ADMIN — ROSUVASTATIN CALCIUM 40 MG: 10 TABLET, FILM COATED ORAL at 20:20

## 2025-05-26 RX ADMIN — SALINE NASAL SPRAY 1 SPRAY: 1.5 SOLUTION NASAL at 22:01

## 2025-05-26 RX ADMIN — ACETAMINOPHEN 650 MG: 325 TABLET ORAL at 05:55

## 2025-05-26 RX ADMIN — SODIUM CHLORIDE, SODIUM LACTATE, POTASSIUM CHLORIDE, AND CALCIUM CHLORIDE: .6; .31; .03; .02 INJECTION, SOLUTION INTRAVENOUS at 06:42

## 2025-05-26 RX ADMIN — ALBUMIN (HUMAN) 25 G: 12.5 INJECTION, SOLUTION INTRAVENOUS at 01:27

## 2025-05-26 ASSESSMENT — PAIN SCALES - GENERAL: PAINLEVEL_OUTOF10: 0

## 2025-05-26 NOTE — CARE COORDINATION
[facility] notification: [id number]  Completed.  Electronically signed by KINGS Aguillon on 5/26/2025 at 2:05 PM

## 2025-05-26 NOTE — PLAN OF CARE
Problem: Safety - Adult  Goal: Free from fall injury  Outcome: Progressing  Note: Pt will remain free from falls throughout hospital stay. Fall precautions in place, bed alarm on, bed in lowest position with wheels locked and side rails 2/4 up. Room door open and hourly rounding completed. Will continue to monitor throughout shift.      Problem: Pain  Goal: Verbalizes/displays adequate comfort level or baseline comfort level  Outcome: Progressing  Note: Pt will be satisfied with pain control. Pt uses numeric pain rating scale with reassessments after pain med administration. Will continue to monitor progression throughout shift.

## 2025-05-26 NOTE — CONSULTS
[facility]  GI Consultation                                                                 Patient: Dionisio Child  : 1945       Date:  2025    Subjective:       History of Present Illness  Patient is a 79 y.o.  male admitted with Hypokalemia [E87.6]  Prostate cancer (HCC) [C61]  GIB (gastrointestinal bleeding) [K92.2]  General weakness [R53.1]  Failure to thrive in adult [R62.7]  Altered mental status, unspecified altered mental status type [R41.82]  Gastrointestinal hemorrhage, unspecified gastrointestinal hemorrhage type [K92.2]  Metastatic malignant neoplasm, unspecified site (HCC) [C79.9]  Hypotension due to hypovolemia [E86.1] who is seen in consult for anemia and failure to thrive.  History is obtained with the assistance of patient's wife, son, and daughter who are at bedside.  He has a history of metastatic prostate cancer, CHF with BiV ICD and left bundle branch block, atrial fibrillation on Pradaxa.  Patient noted to have progressive weakness over the past several days.  Starting in January he began to lose weight with generally reduced appetite.  Eating smaller and smaller amounts with no reported nausea, vomiting, abdominal pain.  Has had intermittent episodes of loose stools per family.  No signs of GI bleeding at home including melena or hematochezia.  Reports he had an EGD and colonoscopy 1 year ago and was told both were normal but he does not recall where he had these tests.  He does endorse using NSAIDs on a daily basis to help with bone pain from his prostate cancer metastases.  He has not moved his bowels since hospital arrival.  He has felt pain in the left and right upper quadrants over the past few days which he thought was related to bone pain versus muscle pain.      Past Medical History:   Diagnosis Date    Cancer (HCC)     prostate    Irregular heart rhythm     Unspecified cerebral artery occlusion with cerebral infarction     no residual

## 2025-05-26 NOTE — CONSENT
Informed Consent for Blood Component Transfusion Note    I have discussed with the spouse the rationale for blood component transfusion; its benefits in treating or preventing fatigue, organ damage, or death; and its risk which includes mild transfusion reactions, rare risk of blood borne infection, or more serious but rare reactions. I have discussed the alternatives to transfusion, including the risk and consequences of not receiving transfusion. The spouse had an opportunity to ask questions and had agreed to proceed with transfusion of blood components.    Electronically signed by Serjio Denise PA-C on 5/25/25 at 11:48 PM EDT

## 2025-05-26 NOTE — ED PROVIDER NOTES
Emergency Department Attending SUPERVISORY Provider Note  Location: [facility] EMERGENCY DEPARTMENT  5/25/2025     Chief Complaint   Patient presents with    Fatigue    Weight Loss    Altered Mental Status     Dx with prostate ca in 2007. Had treatment. Follow up Pet scan this January showed metastatic disease and was started back on a chemo pill. Due to start radiation on June 4th. Pt has since lost 40 # since January and has stopped eating and become lethargic and confused at times.        PATIENT ID:  Dionisio Child is a 79 y.o. male    Past Medical History:   Diagnosis Date    Cancer (HCC)     prostate    Irregular heart rhythm     Unspecified cerebral artery occlusion with cerebral infarction     no residual       Dionisio Child was evaluated in the Emergency Department for concerns of fatigue, weight loss, some lightheadedness, and 40 pound weight loss.  He is not really eating or drinking at all.  I was asked to evaluate this patient, as he arrives emergency department his blood pressure soft.    Please see below, but I do a stool occult, and he has maroon-colored stool.  Family says they have not noticed any bleeding.  He has never required blood transfusion in the past, but did get plasmapheresis in the past.    Due to start radiation on June 4.  They are quite concerned about his condition overall.  No chest pain or shortness of breath.  Patient is at the baseline of his mentation.    Wife says his blood pressure is usually 100s over 60s..          Vitals:    05/25/25 1638   BP: (!) 85/48   Pulse: 72   Resp: 12   Temp: 98.2 °F (36.8 °C)   SpO2: 96%        BASIC EXAM:    Focused exam revealed   General: Alert, no acute distress, patient resting comfortably   Skin: Warm, intact, no pallor noted   Head: Normocephalic.  Appears pale.  Alert conversational though.  Eye: Normal conjunctiva   Cardiac: Normal peripheral perfusion  Respiratory: No acute distress   abdominal exam, no pain, rebound, 
DETECTED NOT DETECTED   CBC with Auto Differential   Result Value Ref Range    WBC 8.0 4.0 - 11.0 K/uL    RBC 2.46 (L) 4.20 - 5.90 M/uL    Hemoglobin 7.1 (L) 13.5 - 17.5 g/dL    Hematocrit 20.8 (LL) 40.5 - 52.5 %    MCV 84.8 80.0 - 100.0 fL    MCH 28.8 26.0 - 34.0 pg    MCHC 33.9 31.0 - 36.0 g/dL    RDW 18.4 (H) 12.4 - 15.4 %    Platelets 112 (L) 135 - 450 K/uL    MPV 6.9 5.0 - 10.5 fL    PLATELET SLIDE REVIEW Decreased     SLIDE REVIEW see below     Path Consult Yes     Neutrophils % 51.0 %    Lymphocytes % 20.0 %    Monocytes % 8.0 %    Eosinophils % 0.0 %    Basophils % 0.0 %    Neutrophils Absolute 5.4 1.7 - 7.7 K/uL    Lymphocytes Absolute 1.8 1.0 - 5.1 K/uL    Monocytes Absolute 0.6 0.0 - 1.3 K/uL    Eosinophils Absolute 0.0 0.0 - 0.6 K/uL    Basophils Absolute 0.0 0.0 - 0.2 K/uL    Bands Relative 14 (H) 0 - 7 %    Atypical Lymphocytes Relative 2 0 - 6 %    Metamyelocytes Relative 3 (A) %    Unid.Mononu 2 (A) %    nRBC 4 (A) /100 WBC    Anisocytosis 2+ (A)     Microcytes 1+ (A)     Polychromasia Occasional (A)     Poikilocytes 1+ (A)    Comprehensive Metabolic Panel w/ Reflex to MG   Result Value Ref Range    Sodium 141 136 - 145 mmol/L    Potassium reflex Magnesium 2.9 (LL) 3.5 - 5.1 mmol/L    Chloride 98 (L) 99 - 110 mmol/L    CO2 22 21 - 32 mmol/L    Anion Gap 21 (H) 3 - 16    Glucose 116 (H) 70 - 99 mg/dL    BUN 25 (H) 7 - 20 mg/dL    Creatinine 0.7 (L) 0.8 - 1.3 mg/dL    Est, Glom Filt Rate >90 >60    Calcium 8.8 8.3 - 10.6 mg/dL    Total Protein 6.1 (L) 6.4 - 8.2 g/dL    Albumin 3.2 (L) 3.4 - 5.0 g/dL    Albumin/Globulin Ratio 1.1 1.1 - 2.2    Total Bilirubin 0.9 0.0 - 1.0 mg/dL    Alkaline Phosphatase 334 (H) 40 - 129 U/L    ALT 14 10 - 40 U/L    AST 43 (H) 15 - 37 U/L   Lactic Acid   Result Value Ref Range    Lactic Acid 1.2 0.4 - 2.0 mmol/L   Lipase   Result Value Ref Range    Lipase 34.0 13.0 - 60.0 U/L   Brain Natriuretic Peptide   Result Value Ref Range    NT Pro- (H) 0 - 449 pg/mL

## 2025-05-26 NOTE — H&P
V2.0  History and Physical      Name:  Dionisio Child /Age/Sex: 1945  (79 y.o. male)   MRN & CSN:  [id number] & [id number] Encounter Date/Time: 2025 11:47 PM EDT   Location:  [facility]- PCP: Dora Garza MD       Hospital Day: 1    Assessment and Plan:   Dionisio Child is a 79 y.o. male  who presents with GIB (gastrointestinal bleeding)    Hospital Problems           Last Modified POA    * (Principal) GIB (gastrointestinal bleeding) 2025 Yes         Assessment and Plan:  Generalized weakness and debility with concerns for GI bleed.  Hemoglobin is 7.1 transfuse to keep the hemoglobin at least close to 8.  1 pack of RBC has been ordered.  Telemetry in place.  GI has been consulted n.p.o. over midnight for consideration of EGD  Chronic GERD on PPI  Hypokalemia replete as needed  Hyperlipidemia on Crestor  BPH on tadalafil which can be held  Chronic heart failure with reduced ejection fraction on Entresto and Jardiance Coreg eplerenone  History of pacemaker in place with history of irregular heart rhythm on Pradaxa which can be held  Benign essential hypertension on Coreg at home which is on hold because of soft blood pressures  Mood disorder on Lexapro  Prostate cancer follows up regarding metastatic disease and was started back on chemo pills.  Patient is due for radiation on  patient has lost 40 pounds advance diet as tolerated        Advance Care Planning    10 minutes were spent discussing the patient's resuscitation status, advance care planning, and end of life care with the patient and/or family/surrogate.    The patient and/or family/surrogate voluntarily agreed to participate in ACP services.    Patient's cognitive capacity: Alert and oriented X3    I answered all the patient/family questions that I could within the range and scope of the current medical situation.  We discussed the medical conditions, risks, benefits, outcomes, and goals of care at this time for the patient's

## 2025-05-26 NOTE — CARE COORDINATION
Case Management Assessment  Initial Evaluation    Date/Time of Evaluation: 5/26/2025 2:03 PM  Assessment Completed by: KINGS Aguillon    If patient is discharged prior to next notation, then this note serves as note for discharge by case management.    Patient Name: Dionisio Child                   YOB: 1945  Diagnosis: Hypokalemia [E87.6]  Prostate cancer (HCC) [C61]  GIB (gastrointestinal bleeding) [K92.2]  General weakness [R53.1]  Failure to thrive in adult [R62.7]  Altered mental status, unspecified altered mental status type [R41.82]  Gastrointestinal hemorrhage, unspecified gastrointestinal hemorrhage type [K92.2]  Metastatic malignant neoplasm, unspecified site (HCC) [C79.9]  Hypotension due to hypovolemia [E86.1]                   Date / Time: 5/25/2025  4:20 PM    Patient Admission Status: Inpatient   Readmission Risk (Low < 19, Mod (19-27), High > 27): Readmission Risk Score: 15.8    Current PCP: Dora Garza MD  PCP verified by CM? (P) Yes    Chart Reviewed: Yes      History Provided by: (P) Patient, Child/Family  Patient Orientation: (P) Alert and Oriented    Patient Cognition: (P) Alert    Hospitalization in the last 30 days (Readmission):  No    If yes, Readmission Assessment in  Navigator will be completed.    Advance Directives:      Code Status: Full Code   Patient's Primary Decision Maker is: (P) Legal Next of Kin    Primary Decision Maker: Xenia Child - Spouse - [phone]    Discharge Planning:    Patient lives with: Spouse/Significant Other Type of Home: House  Primary Care Giver: (P) Self  Patient Support Systems include: (P) Spouse/Significant Other, Children   Current Financial resources: (P) None  Current community resources: (P) None  Current services prior to admission: (P) Durable Medical Equipment            Current DME: (P) Walker, Cane            Type of Home Care services:  None    ADLS  Prior functional level: (P) Independent in ADLs/IADLs  Current

## 2025-05-27 ENCOUNTER — ANESTHESIA EVENT (OUTPATIENT)
Dept: ENDOSCOPY | Age: 80
End: 2025-05-27
Payer: OTHER GOVERNMENT

## 2025-05-27 ENCOUNTER — ANESTHESIA (OUTPATIENT)
Dept: ENDOSCOPY | Age: 80
End: 2025-05-27
Payer: OTHER GOVERNMENT

## 2025-05-27 LAB
ALBUMIN SERPL-MCNC: 2.9 G/DL (ref 3.4–5)
ALP SERPL-CCNC: 253 U/L (ref 40–129)
ALT SERPL-CCNC: 9 U/L (ref 10–40)
ANION GAP SERPL CALCULATED.3IONS-SCNC: 17 MMOL/L (ref 3–16)
AST SERPL-CCNC: 25 U/L (ref 15–37)
BILIRUB DIRECT SERPL-MCNC: 0.5 MG/DL (ref 0–0.3)
BILIRUB INDIRECT SERPL-MCNC: 0.3 MG/DL (ref 0–1)
BILIRUB SERPL-MCNC: 0.8 MG/DL (ref 0–1)
BUN SERPL-MCNC: 9 MG/DL (ref 7–20)
CALCIUM SERPL-MCNC: 8.3 MG/DL (ref 8.3–10.6)
CHLORIDE SERPL-SCNC: 104 MMOL/L (ref 99–110)
CO2 SERPL-SCNC: 20 MMOL/L (ref 21–32)
CREAT SERPL-MCNC: 0.5 MG/DL (ref 0.8–1.3)
DEPRECATED RDW RBC AUTO: 17.2 % (ref 12.4–15.4)
EKG ATRIAL RATE: 394 BPM
EKG DIAGNOSIS: NORMAL
EKG P AXIS: 27 DEGREES
EKG Q-T INTERVAL: 494 MS
EKG QRS DURATION: 148 MS
EKG QTC CALCULATION (BAZETT): 544 MS
EKG R AXIS: 78 DEGREES
EKG T AXIS: 26 DEGREES
EKG VENTRICULAR RATE: 73 BPM
GFR SERPLBLD CREATININE-BSD FMLA CKD-EPI: >90 ML/MIN/{1.73_M2}
GLUCOSE SERPL-MCNC: 96 MG/DL (ref 70–99)
HCT VFR BLD AUTO: 22.1 % (ref 40.5–52.5)
HCT VFR BLD AUTO: 22.6 % (ref 40.5–52.5)
HGB BLD-MCNC: 7.4 G/DL (ref 13.5–17.5)
HGB BLD-MCNC: 7.6 G/DL (ref 13.5–17.5)
MAGNESIUM SERPL-MCNC: 1.66 MG/DL (ref 1.8–2.4)
MCH RBC QN AUTO: 28.5 PG (ref 26–34)
MCHC RBC AUTO-ENTMCNC: 33.5 G/DL (ref 31–36)
MCV RBC AUTO: 85.2 FL (ref 80–100)
PATH INTERP BLD-IMP: NORMAL
PHOSPHATE SERPL-MCNC: 2.8 MG/DL (ref 2.5–4.9)
PLATELET # BLD AUTO: 80 K/UL (ref 135–450)
PMV BLD AUTO: 7.2 FL (ref 5–10.5)
POTASSIUM SERPL-SCNC: 3.1 MMOL/L (ref 3.5–5.1)
PROT SERPL-MCNC: 4.8 G/DL (ref 6.4–8.2)
PSA SERPL DL<=0.01 NG/ML-MCNC: 137 NG/ML (ref 0–4)
RBC # BLD AUTO: 2.65 M/UL (ref 4.2–5.9)
SODIUM SERPL-SCNC: 141 MMOL/L (ref 136–145)
WBC # BLD AUTO: 8.1 K/UL (ref 4–11)

## 2025-05-27 PROCEDURE — 3609017100 HC EGD: Performed by: INTERNAL MEDICINE

## 2025-05-27 PROCEDURE — 2500000003 HC RX 250 WO HCPCS: Performed by: STUDENT IN AN ORGANIZED HEALTH CARE EDUCATION/TRAINING PROGRAM

## 2025-05-27 PROCEDURE — 6370000000 HC RX 637 (ALT 250 FOR IP): Performed by: NURSE PRACTITIONER

## 2025-05-27 PROCEDURE — 7100000010 HC PHASE II RECOVERY - FIRST 15 MIN: Performed by: INTERNAL MEDICINE

## 2025-05-27 PROCEDURE — 3609008400 HC SIGMOIDOSCOPY DIAGNOSTIC: Performed by: INTERNAL MEDICINE

## 2025-05-27 PROCEDURE — 94761 N-INVAS EAR/PLS OXIMETRY MLT: CPT

## 2025-05-27 PROCEDURE — 6360000002 HC RX W HCPCS: Performed by: NURSE ANESTHETIST, CERTIFIED REGISTERED

## 2025-05-27 PROCEDURE — 85027 COMPLETE CBC AUTOMATED: CPT

## 2025-05-27 PROCEDURE — 3700000000 HC ANESTHESIA ATTENDED CARE: Performed by: INTERNAL MEDICINE

## 2025-05-27 PROCEDURE — 84153 ASSAY OF PSA TOTAL: CPT

## 2025-05-27 PROCEDURE — 0DJ08ZZ INSPECTION OF UPPER INTESTINAL TRACT, VIA NATURAL OR ARTIFICIAL OPENING ENDOSCOPIC: ICD-10-PCS | Performed by: INTERNAL MEDICINE

## 2025-05-27 PROCEDURE — 85014 HEMATOCRIT: CPT

## 2025-05-27 PROCEDURE — 80048 BASIC METABOLIC PNL TOTAL CA: CPT

## 2025-05-27 PROCEDURE — 6370000000 HC RX 637 (ALT 250 FOR IP): Performed by: STUDENT IN AN ORGANIZED HEALTH CARE EDUCATION/TRAINING PROGRAM

## 2025-05-27 PROCEDURE — 36415 COLL VENOUS BLD VENIPUNCTURE: CPT

## 2025-05-27 PROCEDURE — 85018 HEMOGLOBIN: CPT

## 2025-05-27 PROCEDURE — 83735 ASSAY OF MAGNESIUM: CPT

## 2025-05-27 PROCEDURE — 84100 ASSAY OF PHOSPHORUS: CPT

## 2025-05-27 PROCEDURE — 3700000001 HC ADD 15 MINUTES (ANESTHESIA): Performed by: INTERNAL MEDICINE

## 2025-05-27 PROCEDURE — 93010 ELECTROCARDIOGRAM REPORT: CPT | Performed by: INTERNAL MEDICINE

## 2025-05-27 PROCEDURE — 2580000003 HC RX 258: Performed by: STUDENT IN AN ORGANIZED HEALTH CARE EDUCATION/TRAINING PROGRAM

## 2025-05-27 PROCEDURE — 2709999900 HC NON-CHARGEABLE SUPPLY: Performed by: INTERNAL MEDICINE

## 2025-05-27 PROCEDURE — 80076 HEPATIC FUNCTION PANEL: CPT

## 2025-05-27 PROCEDURE — 2700000000 HC OXYGEN THERAPY PER DAY

## 2025-05-27 PROCEDURE — 6370000000 HC RX 637 (ALT 250 FOR IP): Performed by: PHYSICIAN ASSISTANT

## 2025-05-27 PROCEDURE — 2060000000 HC ICU INTERMEDIATE R&B

## 2025-05-27 PROCEDURE — 6360000002 HC RX W HCPCS: Performed by: STUDENT IN AN ORGANIZED HEALTH CARE EDUCATION/TRAINING PROGRAM

## 2025-05-27 PROCEDURE — 0DJD8ZZ INSPECTION OF LOWER INTESTINAL TRACT, VIA NATURAL OR ARTIFICIAL OPENING ENDOSCOPIC: ICD-10-PCS | Performed by: INTERNAL MEDICINE

## 2025-05-27 PROCEDURE — 7100000011 HC PHASE II RECOVERY - ADDTL 15 MIN: Performed by: INTERNAL MEDICINE

## 2025-05-27 RX ORDER — SODIUM CHLORIDE 0.9 % (FLUSH) 0.9 %
5-40 SYRINGE (ML) INJECTION EVERY 12 HOURS SCHEDULED
Status: CANCELLED | OUTPATIENT
Start: 2025-05-27

## 2025-05-27 RX ORDER — NALOXONE HYDROCHLORIDE 0.4 MG/ML
INJECTION, SOLUTION INTRAMUSCULAR; INTRAVENOUS; SUBCUTANEOUS PRN
Status: CANCELLED | OUTPATIENT
Start: 2025-05-27

## 2025-05-27 RX ORDER — ONDANSETRON 2 MG/ML
4 INJECTION INTRAMUSCULAR; INTRAVENOUS EVERY 30 MIN PRN
Status: CANCELLED | OUTPATIENT
Start: 2025-05-27

## 2025-05-27 RX ORDER — SODIUM CHLORIDE 0.9 % (FLUSH) 0.9 %
5-40 SYRINGE (ML) INJECTION PRN
Status: CANCELLED | OUTPATIENT
Start: 2025-05-27

## 2025-05-27 RX ORDER — PROPOFOL 10 MG/ML
INJECTION, EMULSION INTRAVENOUS
Status: DISCONTINUED | OUTPATIENT
Start: 2025-05-27 | End: 2025-05-27 | Stop reason: SDUPTHER

## 2025-05-27 RX ORDER — SODIUM CHLORIDE 9 MG/ML
INJECTION, SOLUTION INTRAVENOUS PRN
Status: CANCELLED | OUTPATIENT
Start: 2025-05-27

## 2025-05-27 RX ORDER — QUETIAPINE FUMARATE 25 MG/1
25 TABLET, FILM COATED ORAL ONCE
Status: COMPLETED | OUTPATIENT
Start: 2025-05-27 | End: 2025-05-27

## 2025-05-27 RX ADMIN — POLYETHYLENE GLYCOL-3350 AND ELECTROLYTES 2000 ML: 236; 6.74; 5.86; 2.97; 22.74 POWDER, FOR SOLUTION ORAL at 18:24

## 2025-05-27 RX ADMIN — OXYCODONE AND ACETAMINOPHEN 1 TABLET: 325; 10 TABLET ORAL at 00:49

## 2025-05-27 RX ADMIN — Medication 10 ML: at 08:33

## 2025-05-27 RX ADMIN — ESCITALOPRAM OXALATE 10 MG: 10 TABLET ORAL at 08:34

## 2025-05-27 RX ADMIN — SODIUM CHLORIDE, SODIUM LACTATE, POTASSIUM CHLORIDE, AND CALCIUM CHLORIDE: .6; .31; .03; .02 INJECTION, SOLUTION INTRAVENOUS at 06:26

## 2025-05-27 RX ADMIN — PROPOFOL 20 MG: 10 INJECTION, EMULSION INTRAVENOUS at 13:21

## 2025-05-27 RX ADMIN — Medication 10 ML: at 20:05

## 2025-05-27 RX ADMIN — PANTOPRAZOLE SODIUM 40 MG: 40 INJECTION, POWDER, FOR SOLUTION INTRAVENOUS at 08:33

## 2025-05-27 RX ADMIN — PROPOFOL 30 MG: 10 INJECTION, EMULSION INTRAVENOUS at 13:13

## 2025-05-27 RX ADMIN — PANTOPRAZOLE SODIUM 40 MG: 40 INJECTION, POWDER, FOR SOLUTION INTRAVENOUS at 20:05

## 2025-05-27 RX ADMIN — ROSUVASTATIN CALCIUM 40 MG: 10 TABLET, FILM COATED ORAL at 20:05

## 2025-05-27 RX ADMIN — SALINE NASAL SPRAY 1 SPRAY: 1.5 SOLUTION NASAL at 08:23

## 2025-05-27 RX ADMIN — POTASSIUM BICARBONATE 40 MEQ: 782 TABLET, EFFERVESCENT ORAL at 11:01

## 2025-05-27 RX ADMIN — MAGNESIUM SULFATE HEPTAHYDRATE 2000 MG: 40 INJECTION, SOLUTION INTRAVENOUS at 11:04

## 2025-05-27 RX ADMIN — PROPOFOL 30 MG: 10 INJECTION, EMULSION INTRAVENOUS at 13:14

## 2025-05-27 RX ADMIN — PROPOFOL 30 MG: 10 INJECTION, EMULSION INTRAVENOUS at 13:10

## 2025-05-27 RX ADMIN — QUETIAPINE FUMARATE 25 MG: 25 TABLET ORAL at 23:39

## 2025-05-27 ASSESSMENT — PAIN SCALES - GENERAL
PAINLEVEL_OUTOF10: 0
PAINLEVEL_OUTOF10: 0
PAINLEVEL_OUTOF10: 4
PAINLEVEL_OUTOF10: 0

## 2025-05-27 NOTE — CONSULTS
Palliative Care Initial Note  Palliative Care Admit date: 5/27/25    ACP/palcare referral noted.  Planned EGD, Sigmoidoscopy today

## 2025-05-27 NOTE — ANESTHESIA POSTPROCEDURE EVALUATION
Department of Anesthesiology  Postprocedure Note    Patient: Dionisio Child  MRN: [id number]  YOB: 1945  Date of evaluation: 5/27/2025    Procedure Summary       Date: 05/27/25 Room / Location: [place] [address] / [facility]    Anesthesia Start: 1302 Anesthesia Stop: 1334    Procedures:       ESOPHAGOGASTRODUODENOSCOPY DIAGNOSTIC ONLY      SIGMOIDOSCOPY DIAGNOSTIC FLEXIBLE Diagnosis:       Gastrointestinal hemorrhage, unspecified gastrointestinal hemorrhage type      (Gastrointestinal hemorrhage, unspecified gastrointestinal hemorrhage type [K92.2])    Surgeons: Todd Adorno MD Responsible Provider: Charlie Winslow MD    Anesthesia Type: MAC ASA Status: 3            Anesthesia Type: MAC    Alexsandra Phase I: Alexsandra Score: 8    Alexsandra Phase II: Alexsandra Score: 8    Anesthesia Post Evaluation    Patient location during evaluation: PACU  Level of consciousness: awake  Airway patency: patent  Nausea & Vomiting: no vomiting  Cardiovascular status: blood pressure returned to baseline  Respiratory status: acceptable  Hydration status: stable  Pain management: adequate    No notable events documented.

## 2025-05-27 NOTE — H&P
[facility]   Pre-operative History and Physical    Patient: Dionisio Child  : 1945  Acct#:     HISTORY OF PRESENT ILLNESS:    The patient is a 79 y.o. male who presents for concern for GIB and rectal thickening on CT. EGD and flex sig scheduled     Indications: GIB rectal thickening     Past Medical History:        Diagnosis Date    Cancer (HCC)     prostate    Irregular heart rhythm     Unspecified cerebral artery occlusion with cerebral infarction     no residual      Past Surgical History:        Procedure Laterality Date    CARDIAC CATHETERIZATION      CYST REMOVAL Right 4/17/15    pre-auricular sebaceous cyst    IR TUNNELED CVC PLACE WO SQ PORT/PUMP > 5 YEARS  2024    IR TUNNELED CATHETER PLACEMENT GREATER THAN 5 YEARS 2024 [facility] SPECIAL PROCEDURES    PACEMAKER INSERTION      PROSTATE BIOPSY      X 2      Medications Prior to Admission:   No current facility-administered medications on file prior to encounter.     Current Outpatient Medications on File Prior to Encounter   Medication Sig Dispense Refill    empagliflozin (JARDIANCE) 25 MG tablet Take 0.5 tablets by mouth daily      sacubitril-valsartan (ENTRESTO) 24-26 MG per tablet Take 1 tablet by mouth 2 times daily      tadalafil (CIALIS) 20 MG tablet Take 1 tablet by mouth daily as needed      escitalopram (LEXAPRO) 10 MG tablet Take 1 tablet by mouth daily      oxyCODONE-acetaminophen (PERCOCET)  MG per tablet Take 1 tablet by mouth every 4 hours as needed for Pain. Max Daily Amount: 6 tablets      enzalutamide (XTANDI) 40 MG capsule Take 4 capsules by mouth daily      carvedilol (COREG) 25 MG tablet Take 1 tablet by mouth 2 times daily (with meals)      eplerenone (INSPRA) 50 MG tablet Take 1 tablet by mouth daily (Patient taking differently: Take 0.5 tablets by mouth daily)      dabigatran (PRADAXA) 150 MG capsule Take 1 capsule by mouth 2 times daily      rosuvastatin (CRESTOR) 40 MG tablet Take 0.5 tablets by mouth

## 2025-05-27 NOTE — PROCEDURES
Endoscopy Note    Patient: Dionisio Child  : 1945      Procedure: Esophagogastroduodenoscopy     Date:  2025     Surgeon:  Todd Adorno MD     Referring Physician:  Dora Garza MD      History:  GI bleeding     INDICATION: GI bleeding,     ASA: 2  SEDATION: MAC         Operative Surgeon: Todd Adorno MD  Scope Type: Gastroscope      Preoperative Diagnosis: GI bleeding  Postoperative Diagnosis: Gastritis without ulceration or source of bleeding    Procedure Performed: EGD    Procedure Details:    With the patient in left lateral position the endoscope was passed through the hypopharynx into the esophagus. The scope as then passed through the esophagus to the second portion of the duodenum. All visualized portions were carefully inspected. The gastric air was suctioned and the scope as removed. Patient tolerated the procedure well. Findings and maneuvers are discussed below.      Complications:  None  Estimated Blood Loss: minimal to none    Post Operative Findings:   Esophagus: Tortuous esophagus otherwise unremarkable in the upper and mid portions.  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  No source of bleeding seen.  Duodenum: Normal    Plan: Negative upper endoscopy for source of GI blood loss.  Flex sig to flex sig        Signed By: MD Todd Mary MD,   [place]  [phone]    Please note that some or all of this record was generated using voice recognition software. If there are any questions about the content of this document, please contact the author as some errors in translation may have occurred.         Colonoscopy Procedure  Note          Patient: Dionisio Child  : 1945      Procedure: Flex Sig     Date:  2025    Primary Care Physician: Dora Garza MD     Operative surgeon: Todd Adorno MD  Previous

## 2025-05-27 NOTE — ANESTHESIA PRE PROCEDURE
kg (149 lb 7.6 oz)      Height:                                                  BP Readings from Last 3 Encounters:   05/27/25 (!) 110/56   12/20/24 (!) 141/71   11/21/24 109/69       NPO Status:                                                                                 BMI:   Wt Readings from Last 3 Encounters:   05/27/25 67.8 kg (149 lb 7.6 oz)   06/26/17 81.6 kg (179 lb 14.3 oz)   06/09/17 81.6 kg (180 lb)     Body mass index is 22.07 kg/m².    CBC:   Lab Results   Component Value Date/Time    WBC 8.1 05/27/2025 04:23 AM    RBC 2.65 05/27/2025 04:23 AM    HGB 7.6 05/27/2025 04:23 AM    HCT 22.6 05/27/2025 04:23 AM    MCV 85.2 05/27/2025 04:23 AM    RDW 17.2 05/27/2025 04:23 AM    PLT 80 05/27/2025 04:23 AM       CMP:   Lab Results   Component Value Date/Time     05/27/2025 04:23 AM    K 3.1 05/27/2025 04:23 AM    K 2.9 05/25/2025 04:58 PM     05/27/2025 04:23 AM    CO2 20 05/27/2025 04:23 AM    BUN 9 05/27/2025 04:23 AM    CREATININE 0.5 05/27/2025 04:23 AM    GFRAA >60 08/20/2014 10:00 AM    GFRAA >60 07/13/2012 09:54 AM    AGRATIO 1.1 05/25/2025 04:58 PM    LABGLOM >90 05/27/2025 04:23 AM    GLUCOSE 96 05/27/2025 04:23 AM    CALCIUM 8.3 05/27/2025 04:23 AM    BILITOT 0.8 05/27/2025 04:23 AM    ALKPHOS 253 05/27/2025 04:23 AM    AST 25 05/27/2025 04:23 AM    ALT 9 05/27/2025 04:23 AM       POC Tests: No results for input(s): \"POCGLU\", \"POCNA\", \"POCK\", \"POCCL\", \"POCBUN\", \"POCHEMO\", \"POCHCT\" in the last 72 hours.    Coags:   Lab Results   Component Value Date/Time    PROTIME 13.7 11/21/2024 10:25 AM    INR 1.03 11/21/2024 10:25 AM       HCG (If Applicable): No results found for: \"PREGTESTUR\", \"PREGSERUM\", \"HCG\", \"HCGQUANT\"     ABGs: No results found for: \"PHART\", \"PO2ART\", \"QMN8GPJ\", \"WBN7TLU\", \"BEART\", \"R7DZIIUN\"     Type & Screen (If Applicable):  Lab Results   Component Value Date    ABORH B POS 05/25/2025    LABANTI NEG 05/25/2025       Drug/Infectious Status (If Applicable):  No results

## 2025-05-27 NOTE — CONSULTS
Reason for Consult: history prostate cancer    History of Present Illness: Dionisio Child is a 79 y.o. patient with prostate cancer [place] 9 disease 4+5 in 12 of 12 cores with extraprostatic extension patient was diagnosed in March 2023. Psa 142.06 (4/2025) PSMA reveals nearly every bone positive this is essentially SuperScan patient has been on Eligard and Xtandi he has progressive disease with a rising PSA.  He is to undergo his first Pluvicto course early next month. He has been weak and fatigued, hgb 6.7 on admission, received 2 units PRBC. Stool occult positive, to undergo EGD, sigmoidoscopy today.   Family at bedside reports confusion and hallucinations intermittently. When he takes the percocet for his pain, \"he is glazed over\".     ROS:  General: no fever or chills  CV: No chest pain  Pulm: No SOB  GI: No nausea, vomiting, diarrhea or constipation  Skin: No rash  Neuro: No headaches, dizziness or neurologic symptoms  : as above  Hematologic: no complaints  Endocrine: no complaints  Psych: no complaints    Past Medical History:   Past Medical History:   Diagnosis Date    Cancer (HCC)     prostate    Irregular heart rhythm     Unspecified cerebral artery occlusion with cerebral infarction     no residual       Past Surgical History:  Past Surgical History:   Procedure Laterality Date    CARDIAC CATHETERIZATION      CYST REMOVAL Right 4/17/15    pre-auricular sebaceous cyst    IR TUNNELED CVC PLACE WO SQ PORT/PUMP > 5 YEARS  11/21/2024    IR TUNNELED CATHETER PLACEMENT GREATER THAN 5 YEARS 11/21/2024 [facility] PROCEDURES    PACEMAKER INSERTION      PROSTATE BIOPSY      X 2       Social History:  Social History     Socioeconomic History    Marital status:      Spouse name: Not on file    Number of children: Not on file    Years of education: Not on file    Highest education level: Not on file   Occupational History    Not on file   Tobacco Use    Smoking status: Former     Types:

## 2025-05-27 NOTE — PLAN OF CARE
Problem: Safety - Adult  Goal: Free from fall injury  Outcome: Progressing  Note: Pt will remain free from falls throughout hospital stay. Fall precautions in place, bed alarm on, bed in lowest position with wheels locked and side rails 2/4 up. Room door open and hourly rounding completed. Will continue to monitor throughout shift.      Problem: Pain  Goal: Verbalizes/displays adequate comfort level or baseline comfort level  Outcome: Progressing  Note: Pt will be satisfied with pain control. Pt uses numeric pain rating scale with reassessments after pain med administration. Will continue to monitor progression throughout shift.      Problem: Chronic Conditions and Co-morbidities  Goal: Patient's chronic conditions and co-morbidity symptoms are monitored and maintained or improved  Outcome: Progressing   CHF Care Plan      Patient's EF (Ejection Fraction) is  Pending  Heart Failure Medications:  Diuretics:: None    (One of the following REQUIRED for EF </= 40%/SYSTOLIC FAILURE but MAY be used in EF% >40%/DIASTOLIC FAILURE)        ACE:: None        ARB:: None         ARNI:: None    (Beta Blockers)  NON- Evidenced Based Beta Blocker (for EF% >40%/DIASTOLIC FAILURE): None    Evidenced Based Beta Blocker::(REQUIRED for EF% <40%/SYSTOLIC FAILURE) None  ...................................................................................................................................................    Failed to redirect to the Timeline version of the [email or web address] SmartLink.      Patient's weights and intake/output reviewed    Daily Weight log at bedside, patient/family participation in use of log: \"yes    Patient's current weight today shows a difference of 2 lbs less than last documented weight.      Intake/Output Summary (Last 24 hours) at 5/27/2025 0244  Last data filed at 5/26/2025 9672  Gross per 24 hour   Intake 0 ml   Output 0 ml   Net 0 ml       Education Booklet Provided: yes    Comorbidities Reviewed Yes    Patient

## 2025-05-27 NOTE — DISCHARGE INSTRUCTIONS
Heart Failure Resources:  Heart Failure Interactive Workbook:  Go to [email or web address].[email or web address]/[email or web address] for a Free Heart Failure Interactive Workbook provided by The American [employer] Association. This interactive workbook will provide information on Healthier Living with Heart Failure. Please copy and paste link into search bar. Use your mouse to scroll through the pages.    HF [place] samaria:   Heart Failure Free smart phone samaria available for iPhone and Android download. Use your phone to track sodium intake, fluid intake, symptoms, and weight.     Low Sodium Diet / Recipes:  Go to [email or web address]ell[employer] website for “renal” diet which is Low Sodium! [employer] is a dialysis company, but this website offers free seasonal cookbooks. Each quarter, they will release 25-30 new recipes with a breakdown of calories, sodium, and glucose. You can also go to www.[email or web address] website for free recipes.     Home Exercise Program:   Identification of Green/Yellow/Red zones:  You should be able to identify when you feel good (green zone), if you have 1-2 symptoms of HF (yellow zone), or if you are in need of medical attention (red zone).  In your CHF education folder you were provided a “stop light tool” to outline this information.     We want to you to rate your exertion levels:    Our therapy team has discussed means of identification with you such as the \"Dorota scale.\"  The Dorota rating scale ranges from 6 to 20, where 6 means \"no exertion at all\" and 20 means \"maximal exertion.\" The goal is to use this to gauge how much effort it is taking for you to do your normal daily tasks.   You should be able to recognize when too much exertion is being expended.    Elements of Energy Conservation:   Prioritize/Plan: Decide what needs to be done today, and what can wait for a later date, write to do lists, plan ahead to avoid extra trips, and gather supplies and equipment needed before starting an activity.   Position:

## 2025-05-27 NOTE — CONSULTS
Comprehensive Nutrition Assessment    Type and Reason for Visit:  Initial, Positive nutrition screen, Consult (MST=2: weight loss, decreased appetite)    Nutrition Recommendations/Plan:   Advance diet as medically feasible   Add Ensure and Magic Cups BID following diet advancement to promote PO intakes   Monitor nutrition adequacy, pertinent labs, bowel habits, wt changes, and clinical progress     Malnutrition Assessment:  Malnutrition Status:  Insufficient data (05/27/25 1411)      Nutrition Assessment:    Consulted for unintentional weight loss, poor PO intakes PTA and ONS. Pt admitted with GIB and FTT. Hx of prostate CA with mets, CHF and Afib. Pt unavailable this date, at EGD and sigmoidoscopy. SLP recommending regular solids and thin liquids when diet able to be advanced. Per chart review, pt restarted on chemo in January and since then has worsening PO intakes and weight loss. Possible 60 lb weight loss over past 6 months. OMEGA weight loss, no weight hx per EMR. No intakes recorded in EMR since admission, was on full liquid diet yesterday. Recommend adding ONS to promote PO intakes following diet advancement. Will monitor.    Nutrition Related Findings:    BM on 5/26. +1 pitting RLE edema. K 3.1. Mg 1.66. Phos 2.8. Wound Type: None       Current Nutrition Intake & Therapies:    Average Meal Intake: NPO  Average Supplements Intake: NPO  Diet NPO  Diet NPO Exceptions are: Sips of Water with Meds    Anthropometric Measures:  Height: 175.3 cm (5' 9\")  Ideal Body Weight (IBW): 160 lbs (73 kg)       Current Body Weight: 67.6 kg (149 lb), 93.1 % IBW. Weight Source: Bed scale  Current BMI (kg/m2): 22  Usual Body Weight:  (no weight hx per EMR)                          BMI Categories: Normal Weight (BMI 22.0 to 24.9) age over 65    Estimated Daily Nutrient Needs:  Energy Requirements Based On: Kcal/kg (28-33)  Weight Used for Energy Requirements: Current  Energy (kcal/day): 0302-5560 kcal  Weight Used for Protein

## 2025-05-28 ENCOUNTER — ANESTHESIA (OUTPATIENT)
Dept: ENDOSCOPY | Age: 80
End: 2025-05-28
Payer: OTHER GOVERNMENT

## 2025-05-28 ENCOUNTER — ANESTHESIA EVENT (OUTPATIENT)
Dept: ENDOSCOPY | Age: 80
End: 2025-05-28
Payer: OTHER GOVERNMENT

## 2025-05-28 LAB
ANION GAP SERPL CALCULATED.3IONS-SCNC: 14 MMOL/L (ref 3–16)
ANION GAP SERPL CALCULATED.3IONS-SCNC: 16 MMOL/L (ref 3–16)
BUN SERPL-MCNC: 6 MG/DL (ref 7–20)
BUN SERPL-MCNC: 6 MG/DL (ref 7–20)
CALCIUM SERPL-MCNC: 8.3 MG/DL (ref 8.3–10.6)
CALCIUM SERPL-MCNC: 8.4 MG/DL (ref 8.3–10.6)
CHLORIDE SERPL-SCNC: 103 MMOL/L (ref 99–110)
CHLORIDE SERPL-SCNC: 104 MMOL/L (ref 99–110)
CO2 SERPL-SCNC: 23 MMOL/L (ref 21–32)
CO2 SERPL-SCNC: 25 MMOL/L (ref 21–32)
CREAT SERPL-MCNC: 0.4 MG/DL (ref 0.8–1.3)
CREAT SERPL-MCNC: 0.5 MG/DL (ref 0.8–1.3)
DEPRECATED RDW RBC AUTO: 17 % (ref 12.4–15.4)
DEPRECATED RDW RBC AUTO: 17.1 % (ref 12.4–15.4)
DEPRECATED RDW RBC AUTO: 17.4 % (ref 12.4–15.4)
GFR SERPLBLD CREATININE-BSD FMLA CKD-EPI: >90 ML/MIN/{1.73_M2}
GFR SERPLBLD CREATININE-BSD FMLA CKD-EPI: >90 ML/MIN/{1.73_M2}
GLUCOSE SERPL-MCNC: 94 MG/DL (ref 70–99)
GLUCOSE SERPL-MCNC: 96 MG/DL (ref 70–99)
HCT VFR BLD AUTO: 23.4 % (ref 40.5–52.5)
HCT VFR BLD AUTO: 23.5 % (ref 40.5–52.5)
HCT VFR BLD AUTO: 24.3 % (ref 40.5–52.5)
HCT VFR BLD AUTO: 25 % (ref 40.5–52.5)
HGB BLD-MCNC: 7.9 G/DL (ref 13.5–17.5)
HGB BLD-MCNC: 7.9 G/DL (ref 13.5–17.5)
HGB BLD-MCNC: 8.2 G/DL (ref 13.5–17.5)
HGB BLD-MCNC: 8.4 G/DL (ref 13.5–17.5)
MAGNESIUM SERPL-MCNC: 1.74 MG/DL (ref 1.8–2.4)
MCH RBC QN AUTO: 28.3 PG (ref 26–34)
MCH RBC QN AUTO: 28.3 PG (ref 26–34)
MCH RBC QN AUTO: 28.7 PG (ref 26–34)
MCHC RBC AUTO-ENTMCNC: 33.5 G/DL (ref 31–36)
MCHC RBC AUTO-ENTMCNC: 33.5 G/DL (ref 31–36)
MCHC RBC AUTO-ENTMCNC: 33.8 G/DL (ref 31–36)
MCV RBC AUTO: 84.2 FL (ref 80–100)
MCV RBC AUTO: 84.5 FL (ref 80–100)
MCV RBC AUTO: 84.8 FL (ref 80–100)
NT-PROBNP SERPL-MCNC: 5615 PG/ML (ref 0–449)
PHOSPHATE SERPL-MCNC: 2.3 MG/DL (ref 2.5–4.9)
PLATELET # BLD AUTO: 63 K/UL (ref 135–450)
PLATELET # BLD AUTO: 66 K/UL (ref 135–450)
PLATELET # BLD AUTO: 69 K/UL (ref 135–450)
PMV BLD AUTO: 7 FL (ref 5–10.5)
PMV BLD AUTO: 7.1 FL (ref 5–10.5)
PMV BLD AUTO: 7.3 FL (ref 5–10.5)
POTASSIUM SERPL-SCNC: 3 MMOL/L (ref 3.5–5.1)
POTASSIUM SERPL-SCNC: 3.3 MMOL/L (ref 3.5–5.1)
RBC # BLD AUTO: 2.78 M/UL (ref 4.2–5.9)
RBC # BLD AUTO: 2.78 M/UL (ref 4.2–5.9)
RBC # BLD AUTO: 2.86 M/UL (ref 4.2–5.9)
SODIUM SERPL-SCNC: 142 MMOL/L (ref 136–145)
SODIUM SERPL-SCNC: 143 MMOL/L (ref 136–145)
WBC # BLD AUTO: 7.8 K/UL (ref 4–11)
WBC # BLD AUTO: 8.4 K/UL (ref 4–11)
WBC # BLD AUTO: 8.5 K/UL (ref 4–11)

## 2025-05-28 PROCEDURE — 2580000003 HC RX 258: Performed by: EMERGENCY MEDICINE

## 2025-05-28 PROCEDURE — 2060000000 HC ICU INTERMEDIATE R&B

## 2025-05-28 PROCEDURE — 0DBP8ZX EXCISION OF RECTUM, VIA NATURAL OR ARTIFICIAL OPENING ENDOSCOPIC, DIAGNOSTIC: ICD-10-PCS | Performed by: INTERNAL MEDICINE

## 2025-05-28 PROCEDURE — 88305 TISSUE EXAM BY PATHOLOGIST: CPT

## 2025-05-28 PROCEDURE — 83880 ASSAY OF NATRIURETIC PEPTIDE: CPT

## 2025-05-28 PROCEDURE — 36415 COLL VENOUS BLD VENIPUNCTURE: CPT

## 2025-05-28 PROCEDURE — 3609010300 HC COLONOSCOPY W/BIOPSY SINGLE/MULTIPLE: Performed by: INTERNAL MEDICINE

## 2025-05-28 PROCEDURE — 6360000002 HC RX W HCPCS: Performed by: NURSE ANESTHETIST, CERTIFIED REGISTERED

## 2025-05-28 PROCEDURE — 80048 BASIC METABOLIC PNL TOTAL CA: CPT

## 2025-05-28 PROCEDURE — 7100000010 HC PHASE II RECOVERY - FIRST 15 MIN: Performed by: INTERNAL MEDICINE

## 2025-05-28 PROCEDURE — 3700000001 HC ADD 15 MINUTES (ANESTHESIA): Performed by: INTERNAL MEDICINE

## 2025-05-28 PROCEDURE — 84100 ASSAY OF PHOSPHORUS: CPT

## 2025-05-28 PROCEDURE — 0DBN8ZX EXCISION OF SIGMOID COLON, VIA NATURAL OR ARTIFICIAL OPENING ENDOSCOPIC, DIAGNOSTIC: ICD-10-PCS | Performed by: INTERNAL MEDICINE

## 2025-05-28 PROCEDURE — 2580000003 HC RX 258: Performed by: STUDENT IN AN ORGANIZED HEALTH CARE EDUCATION/TRAINING PROGRAM

## 2025-05-28 PROCEDURE — 7100000011 HC PHASE II RECOVERY - ADDTL 15 MIN: Performed by: INTERNAL MEDICINE

## 2025-05-28 PROCEDURE — 6360000002 HC RX W HCPCS: Performed by: STUDENT IN AN ORGANIZED HEALTH CARE EDUCATION/TRAINING PROGRAM

## 2025-05-28 PROCEDURE — 6370000000 HC RX 637 (ALT 250 FOR IP): Performed by: NURSE PRACTITIONER

## 2025-05-28 PROCEDURE — 83735 ASSAY OF MAGNESIUM: CPT

## 2025-05-28 PROCEDURE — 6370000000 HC RX 637 (ALT 250 FOR IP): Performed by: STUDENT IN AN ORGANIZED HEALTH CARE EDUCATION/TRAINING PROGRAM

## 2025-05-28 PROCEDURE — 2700000000 HC OXYGEN THERAPY PER DAY

## 2025-05-28 PROCEDURE — 94761 N-INVAS EAR/PLS OXIMETRY MLT: CPT

## 2025-05-28 PROCEDURE — 2500000003 HC RX 250 WO HCPCS: Performed by: STUDENT IN AN ORGANIZED HEALTH CARE EDUCATION/TRAINING PROGRAM

## 2025-05-28 PROCEDURE — 85014 HEMATOCRIT: CPT

## 2025-05-28 PROCEDURE — 6370000000 HC RX 637 (ALT 250 FOR IP): Performed by: PHYSICIAN ASSISTANT

## 2025-05-28 PROCEDURE — 6360000002 HC RX W HCPCS

## 2025-05-28 PROCEDURE — 2709999900 HC NON-CHARGEABLE SUPPLY: Performed by: INTERNAL MEDICINE

## 2025-05-28 PROCEDURE — 85018 HEMOGLOBIN: CPT

## 2025-05-28 PROCEDURE — 3700000000 HC ANESTHESIA ATTENDED CARE: Performed by: INTERNAL MEDICINE

## 2025-05-28 PROCEDURE — 85027 COMPLETE CBC AUTOMATED: CPT

## 2025-05-28 RX ORDER — SODIUM CHLORIDE 0.9 % (FLUSH) 0.9 %
5-40 SYRINGE (ML) INJECTION EVERY 12 HOURS SCHEDULED
Status: CANCELLED | OUTPATIENT
Start: 2025-05-28

## 2025-05-28 RX ORDER — DIPHENHYDRAMINE HYDROCHLORIDE 50 MG/ML
12.5 INJECTION, SOLUTION INTRAMUSCULAR; INTRAVENOUS
Status: CANCELLED | OUTPATIENT
Start: 2025-05-28

## 2025-05-28 RX ORDER — OXYCODONE HYDROCHLORIDE 5 MG/1
10 TABLET ORAL PRN
Refills: 0 | Status: CANCELLED | OUTPATIENT
Start: 2025-05-28 | End: 2025-05-28

## 2025-05-28 RX ORDER — NALOXONE HYDROCHLORIDE 0.4 MG/ML
INJECTION, SOLUTION INTRAMUSCULAR; INTRAVENOUS; SUBCUTANEOUS PRN
Status: CANCELLED | OUTPATIENT
Start: 2025-05-28

## 2025-05-28 RX ORDER — PANTOPRAZOLE SODIUM 40 MG/10ML
40 INJECTION, POWDER, LYOPHILIZED, FOR SOLUTION INTRAVENOUS 2 TIMES DAILY
Status: DISCONTINUED | OUTPATIENT
Start: 2025-05-28 | End: 2025-05-30

## 2025-05-28 RX ORDER — PROPOFOL 10 MG/ML
INJECTION, EMULSION INTRAVENOUS
Status: DISCONTINUED | OUTPATIENT
Start: 2025-05-28 | End: 2025-05-28 | Stop reason: SDUPTHER

## 2025-05-28 RX ORDER — DROPERIDOL 2.5 MG/ML
0.62 INJECTION, SOLUTION INTRAMUSCULAR; INTRAVENOUS
Status: CANCELLED | OUTPATIENT
Start: 2025-05-28

## 2025-05-28 RX ORDER — MAGNESIUM SULFATE IN WATER 40 MG/ML
2000 INJECTION, SOLUTION INTRAVENOUS ONCE
Status: COMPLETED | OUTPATIENT
Start: 2025-05-28 | End: 2025-05-28

## 2025-05-28 RX ORDER — OXYCODONE HYDROCHLORIDE 5 MG/1
5 TABLET ORAL PRN
Refills: 0 | Status: CANCELLED | OUTPATIENT
Start: 2025-05-28 | End: 2025-05-28

## 2025-05-28 RX ORDER — ONDANSETRON 2 MG/ML
4 INJECTION INTRAMUSCULAR; INTRAVENOUS
Status: CANCELLED | OUTPATIENT
Start: 2025-05-28

## 2025-05-28 RX ORDER — LIDOCAINE HYDROCHLORIDE 20 MG/ML
INJECTION, SOLUTION INFILTRATION; PERINEURAL
Status: DISCONTINUED | OUTPATIENT
Start: 2025-05-28 | End: 2025-05-28 | Stop reason: SDUPTHER

## 2025-05-28 RX ORDER — SODIUM CHLORIDE 9 MG/ML
INJECTION, SOLUTION INTRAVENOUS PRN
Status: CANCELLED | OUTPATIENT
Start: 2025-05-28

## 2025-05-28 RX ORDER — POTASSIUM CHLORIDE 1500 MG/1
40 TABLET, EXTENDED RELEASE ORAL ONCE
Status: DISCONTINUED | OUTPATIENT
Start: 2025-05-28 | End: 2025-05-28

## 2025-05-28 RX ORDER — SODIUM CHLORIDE 0.9 % (FLUSH) 0.9 %
5-40 SYRINGE (ML) INJECTION PRN
Status: CANCELLED | OUTPATIENT
Start: 2025-05-28

## 2025-05-28 RX ORDER — LABETALOL HYDROCHLORIDE 5 MG/ML
10 INJECTION, SOLUTION INTRAVENOUS
Status: CANCELLED | OUTPATIENT
Start: 2025-05-28

## 2025-05-28 RX ADMIN — SODIUM CHLORIDE, SODIUM LACTATE, POTASSIUM CHLORIDE, AND CALCIUM CHLORIDE: .6; .31; .03; .02 INJECTION, SOLUTION INTRAVENOUS at 01:27

## 2025-05-28 RX ADMIN — POTASSIUM CHLORIDE 10 MEQ: 7.46 INJECTION, SOLUTION INTRAVENOUS at 07:01

## 2025-05-28 RX ADMIN — PROPOFOL 25 MG: 10 INJECTION, EMULSION INTRAVENOUS at 12:04

## 2025-05-28 RX ADMIN — ROSUVASTATIN CALCIUM 40 MG: 10 TABLET, FILM COATED ORAL at 20:49

## 2025-05-28 RX ADMIN — POTASSIUM CHLORIDE 10 MEQ: 7.46 INJECTION, SOLUTION INTRAVENOUS at 14:24

## 2025-05-28 RX ADMIN — PROPOFOL 25 MG: 10 INJECTION, EMULSION INTRAVENOUS at 11:58

## 2025-05-28 RX ADMIN — MAGNESIUM SULFATE IN WATER 2000 MG: 40 INJECTION, SOLUTION INTRAVENOUS at 18:46

## 2025-05-28 RX ADMIN — POTASSIUM CHLORIDE 10 MEQ: 7.46 INJECTION, SOLUTION INTRAVENOUS at 08:51

## 2025-05-28 RX ADMIN — SODIUM CHLORIDE: 9 INJECTION, SOLUTION INTRAVENOUS at 11:45

## 2025-05-28 RX ADMIN — Medication 10 ML: at 10:07

## 2025-05-28 RX ADMIN — POTASSIUM BICARBONATE 40 MEQ: 782 TABLET, EFFERVESCENT ORAL at 22:41

## 2025-05-28 RX ADMIN — POLYETHYLENE GLYCOL-3350 AND ELECTROLYTES 2000 ML: 236; 6.74; 5.86; 2.97; 22.74 POWDER, FOR SOLUTION ORAL at 03:50

## 2025-05-28 RX ADMIN — LIDOCAINE HYDROCHLORIDE 60 MG: 20 INJECTION, SOLUTION INFILTRATION; PERINEURAL at 11:50

## 2025-05-28 RX ADMIN — POTASSIUM CHLORIDE 10 MEQ: 7.46 INJECTION, SOLUTION INTRAVENOUS at 09:54

## 2025-05-28 RX ADMIN — POTASSIUM CHLORIDE 10 MEQ: 7.46 INJECTION, SOLUTION INTRAVENOUS at 06:06

## 2025-05-28 RX ADMIN — PANTOPRAZOLE SODIUM 40 MG: 40 INJECTION, POWDER, FOR SOLUTION INTRAVENOUS at 10:07

## 2025-05-28 RX ADMIN — PROPOFOL 50 MG: 10 INJECTION, EMULSION INTRAVENOUS at 11:50

## 2025-05-28 RX ADMIN — PANTOPRAZOLE SODIUM 40 MG: 40 INJECTION, POWDER, FOR SOLUTION INTRAVENOUS at 20:49

## 2025-05-28 RX ADMIN — OXYCODONE AND ACETAMINOPHEN 1 TABLET: 325; 10 TABLET ORAL at 18:10

## 2025-05-28 RX ADMIN — POTASSIUM CHLORIDE 10 MEQ: 7.46 INJECTION, SOLUTION INTRAVENOUS at 16:38

## 2025-05-28 RX ADMIN — Medication 10 ML: at 20:49

## 2025-05-28 ASSESSMENT — PAIN SCALES - GENERAL
PAINLEVEL_OUTOF10: 0
PAINLEVEL_OUTOF10: 5
PAINLEVEL_OUTOF10: 0

## 2025-05-28 ASSESSMENT — PAIN DESCRIPTION - LOCATION: LOCATION: CHEST;OTHER (COMMENT)

## 2025-05-28 ASSESSMENT — PAIN - FUNCTIONAL ASSESSMENT: PAIN_FUNCTIONAL_ASSESSMENT: 0-10

## 2025-05-28 NOTE — ANESTHESIA PRE PROCEDURE
Department of Anesthesiology  Preprocedure Note       Name:  Dionisio Child   Age:  79 y.o.  :  1945                                          MRN:  [id number]         Date:  2025      Surgeon: Surgeon(s):  Todd Adorno MD    Procedure: Procedure(s):  COLONOSCOPY    Medications prior to admission:   Prior to Admission medications    Medication Sig Start Date End Date Taking? Authorizing Provider   empagliflozin (JARDIANCE) 25 MG tablet Take 0.5 tablets by mouth daily 10/18/24  Yes Serg Edward MD   sacubitril-valsartan (ENTRESTO) 24-26 MG per tablet Take 1 tablet by mouth 2 times daily 10/18/24  Yes ProviderSerg MD   tadalafil (CIALIS) 20 MG tablet Take 1 tablet by mouth daily as needed   Yes ProviderSerg MD   escitalopram (LEXAPRO) 10 MG tablet Take 1 tablet by mouth daily   Yes Serg Edward MD   oxyCODONE-acetaminophen (PERCOCET)  MG per tablet Take 1 tablet by mouth every 4 hours as needed for Pain. Max Daily Amount: 6 tablets   Yes Serg Edward MD   enzalutamide (XTANDI) 40 MG capsule Take 4 capsules by mouth daily 10/1/24  Yes ProviderSerg MD   carvedilol (COREG) 25 MG tablet Take 1 tablet by mouth 2 times daily (with meals)   Yes ProviderSerg MD   eplerenone (INSPRA) 50 MG tablet Take 1 tablet by mouth daily  Patient taking differently: Take 0.5 tablets by mouth daily   Yes Serg Edward MD   dabigatran (PRADAXA) 150 MG capsule Take 1 capsule by mouth 2 times daily   Yes ProviderSerg MD   rosuvastatin (CRESTOR) 40 MG tablet Take 0.5 tablets by mouth daily 4/14/15   Serg Edward MD   losartan (COZAAR) 100 MG tablet Take 1 tablet by mouth daily  Patient not taking: Reported on 2025    Serg Edward MD       Current medications:    Current Facility-Administered Medications   Medication Dose Route Frequency Provider Last Rate Last Admin    potassium phosphate 30 mmol in sodium chloride 0.9 %

## 2025-05-28 NOTE — H&P
[facility]   Pre-operative History and Physical    Patient: Dionisio Child  : 1945  Acct#:     HISTORY OF PRESENT ILLNESS:    The patient is a 79 y.o. male who presents for GI bleeding     Indications: GI bleeding     Past Medical History:        Diagnosis Date    Cancer (HCC)     prostate    Irregular heart rhythm     Unspecified cerebral artery occlusion with cerebral infarction     no residual      Past Surgical History:        Procedure Laterality Date    CARDIAC CATHETERIZATION      CYST REMOVAL Right 4/17/15    pre-auricular sebaceous cyst    IR TUNNELED CVC PLACE WO SQ PORT/PUMP > 5 YEARS  2024    IR TUNNELED CATHETER PLACEMENT GREATER THAN 5 YEARS 2024 [facility] SPECIAL PROCEDURES    PACEMAKER INSERTION      PROSTATE BIOPSY      X 2    SIGMOIDOSCOPY N/A 2025    SIGMOIDOSCOPY DIAGNOSTIC FLEXIBLE performed by Todd Adorno MD at [facility] ENDOSCOPY    UPPER GASTROINTESTINAL ENDOSCOPY N/A 2025    ESOPHAGOGASTRODUODENOSCOPY DIAGNOSTIC ONLY performed by Todd Adorno MD at [facility] ENDOSCOPY      Medications Prior to Admission:   No current facility-administered medications on file prior to encounter.     Current Outpatient Medications on File Prior to Encounter   Medication Sig Dispense Refill    empagliflozin (JARDIANCE) 25 MG tablet Take 0.5 tablets by mouth daily      sacubitril-valsartan (ENTRESTO) 24-26 MG per tablet Take 1 tablet by mouth 2 times daily      tadalafil (CIALIS) 20 MG tablet Take 1 tablet by mouth daily as needed      escitalopram (LEXAPRO) 10 MG tablet Take 1 tablet by mouth daily      oxyCODONE-acetaminophen (PERCOCET)  MG per tablet Take 1 tablet by mouth every 4 hours as needed for Pain. Max Daily Amount: 6 tablets      enzalutamide (XTANDI) 40 MG capsule Take 4 capsules by mouth daily      carvedilol (COREG) 25 MG tablet Take 1 tablet by mouth 2 times daily (with meals)      eplerenone (INSPRA) 50 MG tablet Take 1 tablet by mouth daily

## 2025-05-28 NOTE — PROCEDURES
Colonoscopy Procedure  Note          Patient: Dionisio Child  : 1945      Procedure: Colonoscopy with biopsies    Date:  2025    Primary Care Physician: Dora Garza MD     Operative surgeon: Todd Adorno MD  Previous Colonoscopy: Flexible sigmoidoscopy yesterday  Consent: I explained and discussed the risk, benefits and alternatives for the procedure with the patient and obtained the patient's consent for the procedure. We discussed the specific risks including bleeding, perforation, post-procedure abdominal pain, and missed lesions which could lead to interval colorectal cancers.      History:  Inpatient: EGD and flex yesterday without clear cause of bleeding. Full colonoscopy today      Past Medical History:   Diagnosis Date    Cancer (HCC)     prostate    Irregular heart rhythm     Unspecified cerebral artery occlusion with cerebral infarction     no residual      Preoperative Diagnosis: Anemia, unspecified type [D64.9]  Post Operative Diagnosis: Sigmoid colitis, proctitis, diverticulosis  ASA: 3  SEDATION: MAC      Procedures Performed: Colonoscopy   Scope Type: Pediatric    Procedure Details:      With the patient in left lateral decubitus position the endoscope was inserted through the anorectal area into the rectum. The scope was then advanced through the length of the colon to the cecum and terminal ileum. The quality of preparation was good.  The scope was carefully withdrawn with careful inspection. Images were taken of the multiple segments of colon, cecum, C valve, rectum, terminal ileum. Retroflexion was preformed in the rectum.  2 separate passes were made of the right colon.       Cecum Intubated: Yes  EBL: minimal to none  Complications:  no complications were noted  Post-operative Findings: Perianal exam unremarkable, digital rectal exam retroflexion rectum demonstrated moderate internal hemorrhoids    There was a linear erosion in the rectum likely from prior enema and very

## 2025-05-28 NOTE — ANESTHESIA POSTPROCEDURE EVALUATION
Department of Anesthesiology  Postprocedure Note    Patient: Dionisio Child  MRN: [id number]  YOB: 1945  Date of evaluation: 5/28/2025    Procedure Summary       Date: 05/28/25 Room / Location: [place] [address] / [facility]    Anesthesia Start: 1145 Anesthesia Stop: 1208    Procedures:       COLONOSCOPY BIOPSY      BOWEL SMALL CAPSULE ENDOSCOPY Diagnosis:       Anemia, unspecified type      (Anemia, unspecified type [D64.9])    Surgeons: Todd Adorno MD Responsible Provider: Kal Arias MD    Anesthesia Type: MAC ASA Status: 3            Anesthesia Type: No value filed.    Alexsandra Phase I: Alexsandra Score: 10    Alexsandra Phase II: Alexsandra Score: 9    Anesthesia Post Evaluation    Comments: Postoperative Anesthesia Note    Name:    Dionisio Child  MRN:      [id number]    Patient Vitals in the past 12 hrs:  05/28/25 1840, Resp:18  05/28/25 1810, Resp:18 05/28/25 1545, BP:129/70, Temp:99 °F (37.2 °C), Temp src:Oral, Pulse:89, Resp:16, SpO2:96 %  05/28/25 1415, BP:122/64, Temp:98.4 °F (36.9 °C), Temp src:Axillary, Pulse:83, Resp:16, SpO2:96 %  05/28/25 1225, BP:109/66, Pulse:88, Resp:16, SpO2:(!) 9 %  05/28/25 1220, BP:110/63, Pulse:90, Resp:16, SpO2:95 %  05/28/25 1215, BP:104/64, Pulse:89, Resp:16, SpO2:95 %  05/28/25 1210, BP:121/71, Temp:97.9 °F (36.6 °C), Temp src:Temporal, Pulse:84, Resp:16, SpO2:(!) 82 %  05/28/25 1052, BP:(!) 102/58, Temp:98.6 °F (37 °C), Temp src:Oral, Pulse:95, Resp:16, SpO2:95 %  05/28/25 0855, BP:(!) 111/53, Temp:98.4 °F (36.9 °C), Temp src:Axillary, Pulse:95, Resp:18, SpO2:92 %  05/28/25 0832, SpO2:93 %     LABS:    CBC  Lab Results       Component                Value               Date/Time                  WBC                      8.4                 05/28/2025 04:27 AM        HGB                      8.4 (L)             05/28/2025 03:23 PM        HCT                      25.0 (L)            05/28/2025 03:23 PM        PLT                      69 (L)

## 2025-05-28 NOTE — PLAN OF CARE
Problem: Safety - Adult  Goal: Free from fall injury  Outcome: Progressing  Pt will remain free from falls throughout hospital stay. Fall precautions in place, bed alarm on, bed in lowest position with wheels locked and side rails 2/4 up. Room door open and hourly rounding completed. Will continue to monitor throughout shift.      Problem: Chronic Conditions and Co-morbidities  Goal: Patient's chronic conditions and co-morbidity symptoms are monitored and maintained or improved  Outcome: Progressing     CHF Care Plan      Patient's EF (Ejection Fraction) no echo on file    Heart Failure Medications:  Diuretics:: None    (One of the following REQUIRED for EF </= 40%/SYSTOLIC FAILURE but MAY be used in EF% >40%/DIASTOLIC FAILURE)        ACE:: None        ARB:: None         ARNI:: None    (Beta Blockers)  NON- Evidenced Based Beta Blocker (for EF% >40%/DIASTOLIC FAILURE): None    Evidenced Based Beta Blocker::(REQUIRED for EF% <40%/SYSTOLIC FAILURE) None  ...................................................................................................................................................    Failed to redirect to the Timeline version of the [employer] SmartLink.      Patient's weights and intake/output reviewed    Daily Weight log at bedside, patient/family participation in use of log: \"yes    Patient's current weight today shows a difference of 8 lbs more than last documented weight.      Intake/Output Summary (Last 24 hours) at 5/28/2025 1514  Last data filed at 5/28/2025 1415  Gross per 24 hour   Intake 120 ml   Output 1100 ml   Net -980 ml       Education Booklet Provided: yes    Comorbidities Reviewed No    Patient has a past medical history of Cancer (HCC), Irregular heart rhythm, and Unspecified cerebral artery occlusion with cerebral infarction.     >>For CHF and Comorbidity documentation on Education Time and Topics, please see Education Tab      CHF Education    Learners:  Significant

## 2025-05-28 NOTE — CARE COORDINATION
Spoke with patient at bedside for introduction.   Patient is from home with his wife and was independent prior to admission.   CM will continue to follow for needs.

## 2025-05-29 ENCOUNTER — APPOINTMENT (OUTPATIENT)
Age: 80
DRG: 377 | End: 2025-05-29
Payer: OTHER GOVERNMENT

## 2025-05-29 PROBLEM — E43 SEVERE MALNUTRITION: Chronic | Status: ACTIVE | Noted: 2025-05-29

## 2025-05-29 LAB
ANION GAP SERPL CALCULATED.3IONS-SCNC: 13 MMOL/L (ref 3–16)
ANISOCYTOSIS BLD QL SMEAR: ABNORMAL
BACTERIA BLD CULT ORG #2: NORMAL
BACTERIA BLD CULT: NORMAL
BASOPHILS # BLD: 0 K/UL (ref 0–0.2)
BASOPHILS NFR BLD: 0 %
BUN SERPL-MCNC: 6 MG/DL (ref 7–20)
CALCIUM SERPL-MCNC: 8.1 MG/DL (ref 8.3–10.6)
CHLORIDE SERPL-SCNC: 105 MMOL/L (ref 99–110)
CO2 SERPL-SCNC: 26 MMOL/L (ref 21–32)
CREAT SERPL-MCNC: 0.5 MG/DL (ref 0.8–1.3)
DEPRECATED RDW RBC AUTO: 17.3 % (ref 12.4–15.4)
EOSINOPHIL # BLD: 0.1 K/UL (ref 0–0.6)
EOSINOPHIL NFR BLD: 1 %
GFR SERPLBLD CREATININE-BSD FMLA CKD-EPI: >90 ML/MIN/{1.73_M2}
GLUCOSE SERPL-MCNC: 98 MG/DL (ref 70–99)
HCT VFR BLD AUTO: 22.6 % (ref 40.5–52.5)
HCT VFR BLD AUTO: 24.7 % (ref 40.5–52.5)
HGB BLD-MCNC: 7.7 G/DL (ref 13.5–17.5)
HGB BLD-MCNC: 8.3 G/DL (ref 13.5–17.5)
LYMPHOCYTES # BLD: 0.6 K/UL (ref 1–5.1)
LYMPHOCYTES NFR BLD: 8 %
MACROCYTES BLD QL SMEAR: ABNORMAL
MAGNESIUM SERPL-MCNC: 2.1 MG/DL (ref 1.8–2.4)
MCH RBC QN AUTO: 28.8 PG (ref 26–34)
MCHC RBC AUTO-ENTMCNC: 34.2 G/DL (ref 31–36)
MCV RBC AUTO: 84.3 FL (ref 80–100)
MICROCYTES BLD QL SMEAR: ABNORMAL
MONOCYTES # BLD: 0.3 K/UL (ref 0–1.3)
MONOCYTES NFR BLD: 4 %
NEUTROPHILS # BLD: 7 K/UL (ref 1.7–7.7)
NEUTROPHILS NFR BLD: 76 %
NEUTS BAND NFR BLD MANUAL: 11 % (ref 0–7)
PATH INTERP BLD-IMP: NO
PHOSPHATE SERPL-MCNC: 2.7 MG/DL (ref 2.5–4.9)
PLATELET # BLD AUTO: 64 K/UL (ref 135–450)
PLATELET BLD QL SMEAR: ADEQUATE
PMV BLD AUTO: 7.3 FL (ref 5–10.5)
POIKILOCYTOSIS BLD QL SMEAR: ABNORMAL
POLYCHROMASIA BLD QL SMEAR: ABNORMAL
POTASSIUM SERPL-SCNC: 3.7 MMOL/L (ref 3.5–5.1)
RBC # BLD AUTO: 2.67 M/UL (ref 4.2–5.9)
SLIDE REVIEW: ABNORMAL
SODIUM SERPL-SCNC: 144 MMOL/L (ref 136–145)
WBC # BLD AUTO: 8 K/UL (ref 4–11)

## 2025-05-29 PROCEDURE — 97530 THERAPEUTIC ACTIVITIES: CPT

## 2025-05-29 PROCEDURE — 80048 BASIC METABOLIC PNL TOTAL CA: CPT

## 2025-05-29 PROCEDURE — 85014 HEMATOCRIT: CPT

## 2025-05-29 PROCEDURE — 97116 GAIT TRAINING THERAPY: CPT

## 2025-05-29 PROCEDURE — 6370000000 HC RX 637 (ALT 250 FOR IP): Performed by: NURSE PRACTITIONER

## 2025-05-29 PROCEDURE — 2500000003 HC RX 250 WO HCPCS: Performed by: STUDENT IN AN ORGANIZED HEALTH CARE EDUCATION/TRAINING PROGRAM

## 2025-05-29 PROCEDURE — 2060000000 HC ICU INTERMEDIATE R&B

## 2025-05-29 PROCEDURE — 83735 ASSAY OF MAGNESIUM: CPT

## 2025-05-29 PROCEDURE — 85018 HEMOGLOBIN: CPT

## 2025-05-29 PROCEDURE — 6360000002 HC RX W HCPCS

## 2025-05-29 PROCEDURE — 84100 ASSAY OF PHOSPHORUS: CPT

## 2025-05-29 PROCEDURE — 6370000000 HC RX 637 (ALT 250 FOR IP): Performed by: STUDENT IN AN ORGANIZED HEALTH CARE EDUCATION/TRAINING PROGRAM

## 2025-05-29 PROCEDURE — 97166 OT EVAL MOD COMPLEX 45 MIN: CPT

## 2025-05-29 PROCEDURE — 97535 SELF CARE MNGMENT TRAINING: CPT

## 2025-05-29 PROCEDURE — 97162 PT EVAL MOD COMPLEX 30 MIN: CPT

## 2025-05-29 PROCEDURE — 2580000003 HC RX 258

## 2025-05-29 PROCEDURE — 85025 COMPLETE CBC W/AUTO DIFF WBC: CPT

## 2025-05-29 PROCEDURE — 2500000003 HC RX 250 WO HCPCS

## 2025-05-29 PROCEDURE — 36415 COLL VENOUS BLD VENIPUNCTURE: CPT

## 2025-05-29 RX ADMIN — POTASSIUM PHOSPHATE, MONOBASIC POTASSIUM PHOSPHATE, DIBASIC 15 MMOL: 224; 236 INJECTION, SOLUTION, CONCENTRATE INTRAVENOUS at 09:09

## 2025-05-29 RX ADMIN — ROSUVASTATIN CALCIUM 40 MG: 10 TABLET, FILM COATED ORAL at 19:47

## 2025-05-29 RX ADMIN — Medication 10 ML: at 19:51

## 2025-05-29 RX ADMIN — OXYCODONE AND ACETAMINOPHEN 1 TABLET: 325; 10 TABLET ORAL at 11:39

## 2025-05-29 RX ADMIN — SALINE NASAL SPRAY 1 SPRAY: 1.5 SOLUTION NASAL at 21:49

## 2025-05-29 RX ADMIN — PANTOPRAZOLE SODIUM 40 MG: 40 INJECTION, POWDER, FOR SOLUTION INTRAVENOUS at 09:03

## 2025-05-29 RX ADMIN — ESCITALOPRAM OXALATE 10 MG: 10 TABLET ORAL at 09:03

## 2025-05-29 RX ADMIN — PANTOPRAZOLE SODIUM 40 MG: 40 INJECTION, POWDER, FOR SOLUTION INTRAVENOUS at 19:47

## 2025-05-29 ASSESSMENT — PAIN SCALES - GENERAL
PAINLEVEL_OUTOF10: 6
PAINLEVEL_OUTOF10: 0

## 2025-05-29 ASSESSMENT — PAIN DESCRIPTION - LOCATION: LOCATION: BACK

## 2025-05-29 ASSESSMENT — PAIN DESCRIPTION - ORIENTATION: ORIENTATION: LOWER

## 2025-05-29 NOTE — PLAN OF CARE
Problem: Safety - Adult  Goal: Free from fall injury  5/29/2025 0023 by Dayanara Prescott, RN  Outcome: Progressing  Note: Pt will remain free from falls throughout hospital stay. Fall precautions in place, bed alarm on, bed in lowest position with wheels locked and side rails 2/4 up. Room door open and hourly rounding completed. Will continue to monitor throughout shift.      Problem: Pain  Goal: Verbalizes/displays adequate comfort level or baseline comfort level  Outcome: Progressing  Note: Pt will be satisfied with pain control. Pt uses numeric pain rating scale with reassessments after pain med administration. Will continue to monitor progression throughout shift.      Problem: Chronic Conditions and Co-morbidities  Goal: Patient's chronic conditions and co-morbidity symptoms are monitored and maintained or improved  5/29/2025 0023 by Dayanara Prescott, RN  Outcome: Progressing   CHF Care Plan      Patient's EF (Ejection Fraction) no echo  Heart Failure Medications:  Diuretics:: None    (One of the following REQUIRED for EF </= 40%/SYSTOLIC FAILURE but MAY be used in EF% >40%/DIASTOLIC FAILURE)        ACE:: None        ARB:: None         ARNI:: None    (Beta Blockers)  NON- Evidenced Based Beta Blocker (for EF% >40%/DIASTOLIC FAILURE): None    Evidenced Based Beta Blocker::(REQUIRED for EF% <40%/SYSTOLIC FAILURE) None  ...................................................................................................................................................    Failed to redirect to the Timeline version of the "[employer] SmartLink.      Patient's weights and intake/output reviewed    Daily Weight log at bedside, patient/family participation in use of log: \"yes    Patient's current weight today shows a difference of 8 lbs more than last documented weight.      Intake/Output Summary (Last 24 hours) at 5/29/2025 0024  Last data filed at 5/28/2025 1824  Gross per 24 hour   Intake 220 ml   Output 600 ml   Net -380 ml

## 2025-05-29 NOTE — PLAN OF CARE
Problem: Pain  Goal: Verbalizes/displays adequate comfort level or baseline comfort level  5/29/2025 1237 by Ashley Duke RN  Outcome: Progressing     Problem: Safety - Adult  Goal: Free from fall injury  5/29/2025 1237 by Ashley Duke, RN  Outcome: Progressing     Problem: Skin/Tissue Integrity  Goal: Skin integrity remains intact  Description: 1.  Monitor for areas of redness and/or skin breakdown2.  Assess vascular access sites hourly3.  Every 4-6 hours minimum:  Change oxygen saturation probe site4.  Every 4-6 hours:  If on nasal continuous positive airway pressure, respiratory therapy assess nares and determine need for appliance change or resting period  Outcome: Progressing     Problem: Nutrition Deficit:  Goal: Optimize nutritional status  Outcome: Progressing     Problem: Chronic Conditions and Co-morbidities  Goal: Patient's chronic conditions and co-morbidity symptoms are monitored and maintained or improved  5/29/2025 1237 by Ashley Duke, RN  Outcome: Progressing

## 2025-05-30 LAB
ANION GAP SERPL CALCULATED.3IONS-SCNC: 13 MMOL/L (ref 3–16)
ANISOCYTOSIS BLD QL SMEAR: ABNORMAL
BASOPHILS # BLD: 0.1 K/UL (ref 0–0.2)
BASOPHILS NFR BLD: 1 %
BUN SERPL-MCNC: 7 MG/DL (ref 7–20)
CALCIUM SERPL-MCNC: 8 MG/DL (ref 8.3–10.6)
CHLORIDE SERPL-SCNC: 105 MMOL/L (ref 99–110)
CO2 SERPL-SCNC: 26 MMOL/L (ref 21–32)
CREAT SERPL-MCNC: 0.5 MG/DL (ref 0.8–1.3)
DEPRECATED RDW RBC AUTO: 17.6 % (ref 12.4–15.4)
EOSINOPHIL # BLD: 0.5 K/UL (ref 0–0.6)
EOSINOPHIL NFR BLD: 7 %
GFR SERPLBLD CREATININE-BSD FMLA CKD-EPI: >90 ML/MIN/{1.73_M2}
GLUCOSE SERPL-MCNC: 103 MG/DL (ref 70–99)
HCT VFR BLD AUTO: 23.9 % (ref 40.5–52.5)
HGB BLD-MCNC: 8 G/DL (ref 13.5–17.5)
LYMPHOCYTES # BLD: 1.3 K/UL (ref 1–5.1)
LYMPHOCYTES NFR BLD: 17 %
MACROCYTES BLD QL SMEAR: ABNORMAL
MCH RBC QN AUTO: 28.5 PG (ref 26–34)
MCHC RBC AUTO-ENTMCNC: 33.5 G/DL (ref 31–36)
MCV RBC AUTO: 84.9 FL (ref 80–100)
METAMYELOCYTES NFR BLD MANUAL: 3 %
MICROCYTES BLD QL SMEAR: ABNORMAL
MONOCYTES # BLD: 0.9 K/UL (ref 0–1.3)
MONOCYTES NFR BLD: 12 %
NEUTROPHILS # BLD: 4.9 K/UL (ref 1.7–7.7)
NEUTROPHILS NFR BLD: 49 %
NEUTS BAND NFR BLD MANUAL: 11 % (ref 0–7)
NRBC BLD-RTO: 1 /100 WBC
OVALOCYTES BLD QL SMEAR: ABNORMAL
PATH INTERP BLD-IMP: NO
PLATELET # BLD AUTO: 67 K/UL (ref 135–450)
PLATELET BLD QL SMEAR: ABNORMAL
PMV BLD AUTO: 7.1 FL (ref 5–10.5)
POIKILOCYTOSIS BLD QL SMEAR: ABNORMAL
POLYCHROMASIA BLD QL SMEAR: ABNORMAL
POTASSIUM SERPL-SCNC: 3.5 MMOL/L (ref 3.5–5.1)
RBC # BLD AUTO: 2.81 M/UL (ref 4.2–5.9)
SCHISTOCYTES BLD QL SMEAR: ABNORMAL
SLIDE REVIEW: ABNORMAL
SODIUM SERPL-SCNC: 144 MMOL/L (ref 136–145)
WBC # BLD AUTO: 7.8 K/UL (ref 4–11)

## 2025-05-30 PROCEDURE — 6370000000 HC RX 637 (ALT 250 FOR IP): Performed by: STUDENT IN AN ORGANIZED HEALTH CARE EDUCATION/TRAINING PROGRAM

## 2025-05-30 PROCEDURE — 2700000000 HC OXYGEN THERAPY PER DAY

## 2025-05-30 PROCEDURE — 0DJ07ZZ INSPECTION OF UPPER INTESTINAL TRACT, VIA NATURAL OR ARTIFICIAL OPENING: ICD-10-PCS | Performed by: INTERNAL MEDICINE

## 2025-05-30 PROCEDURE — 36415 COLL VENOUS BLD VENIPUNCTURE: CPT

## 2025-05-30 PROCEDURE — 80048 BASIC METABOLIC PNL TOTAL CA: CPT

## 2025-05-30 PROCEDURE — 6360000002 HC RX W HCPCS

## 2025-05-30 PROCEDURE — 2500000003 HC RX 250 WO HCPCS: Performed by: STUDENT IN AN ORGANIZED HEALTH CARE EDUCATION/TRAINING PROGRAM

## 2025-05-30 PROCEDURE — 94761 N-INVAS EAR/PLS OXIMETRY MLT: CPT

## 2025-05-30 PROCEDURE — 6370000000 HC RX 637 (ALT 250 FOR IP): Performed by: NURSE PRACTITIONER

## 2025-05-30 PROCEDURE — 85025 COMPLETE CBC W/AUTO DIFF WBC: CPT

## 2025-05-30 PROCEDURE — 2060000000 HC ICU INTERMEDIATE R&B

## 2025-05-30 RX ORDER — PANTOPRAZOLE SODIUM 40 MG/1
40 TABLET, DELAYED RELEASE ORAL
Status: DISCONTINUED | OUTPATIENT
Start: 2025-05-31 | End: 2025-06-02 | Stop reason: HOSPADM

## 2025-05-30 RX ORDER — CARVEDILOL 6.25 MG/1
12.5 TABLET ORAL 2 TIMES DAILY WITH MEALS
Status: DISCONTINUED | OUTPATIENT
Start: 2025-05-30 | End: 2025-06-02 | Stop reason: HOSPADM

## 2025-05-30 RX ORDER — OXYMETAZOLINE HYDROCHLORIDE 0.05 G/100ML
2 SPRAY NASAL 2 TIMES DAILY
Status: COMPLETED | OUTPATIENT
Start: 2025-05-30 | End: 2025-06-01

## 2025-05-30 RX ADMIN — PANTOPRAZOLE SODIUM 40 MG: 40 INJECTION, POWDER, FOR SOLUTION INTRAVENOUS at 08:13

## 2025-05-30 RX ADMIN — OXYCODONE AND ACETAMINOPHEN 1 TABLET: 325; 10 TABLET ORAL at 13:32

## 2025-05-30 RX ADMIN — SACUBITRIL AND VALSARTAN 1 TABLET: 24; 26 TABLET, FILM COATED ORAL at 17:29

## 2025-05-30 RX ADMIN — OXYCODONE AND ACETAMINOPHEN 1 TABLET: 325; 10 TABLET ORAL at 08:13

## 2025-05-30 RX ADMIN — SACUBITRIL AND VALSARTAN 1 TABLET: 24; 26 TABLET, FILM COATED ORAL at 20:40

## 2025-05-30 RX ADMIN — CARVEDILOL 12.5 MG: 6.25 TABLET, FILM COATED ORAL at 17:29

## 2025-05-30 RX ADMIN — Medication 10 ML: at 20:44

## 2025-05-30 RX ADMIN — Medication 10 ML: at 08:13

## 2025-05-30 RX ADMIN — OXYMETAZOLINE HYDROCHLORIDE 2 SPRAY: 0.5 SPRAY NASAL at 20:43

## 2025-05-30 RX ADMIN — OXYMETAZOLINE HYDROCHLORIDE 2 SPRAY: 0.5 SPRAY NASAL at 17:29

## 2025-05-30 RX ADMIN — ESCITALOPRAM OXALATE 10 MG: 10 TABLET ORAL at 08:13

## 2025-05-30 RX ADMIN — ROSUVASTATIN CALCIUM 40 MG: 10 TABLET, FILM COATED ORAL at 20:40

## 2025-05-30 ASSESSMENT — PAIN DESCRIPTION - LOCATION: LOCATION: BACK

## 2025-05-30 ASSESSMENT — PAIN SCALES - GENERAL
PAINLEVEL_OUTOF10: 6
PAINLEVEL_OUTOF10: 8

## 2025-05-30 NOTE — PROCEDURES
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  Otherwise good visualization throughout the small bowel without other lesions or abnormality.    Plan: Continue to monitor blood counts.  No obvious source of GI blood loss has been seen on upper lower endoscopy and capsule endoscopy.  He does have a single AVM that was nonbleeding.  At this time I do not think we need to proceed with balloon enteroscopy or push enteroscopy in the absence of signs of active bleeding.  Noted thrombocytopenia that is also new.  Consider hematology consult if anemia is progressive along with thrombocytopenia.        Signed By: MD Todd Mary MD,   [facility]  [phone]    Please note that some or all of this record was generated using voice recognition software. If there are any questions about the content of this document, please contact the author as some errors in translation may have occurred.

## 2025-05-30 NOTE — CARE COORDINATION
Therapy rec's of home care noted.  Discussed rec's with patient and wife and they are in agreement with this plan and would like whatever home care agency that [facility] uses.   Pl[facility] call [facility], Dr. Garza, and message left with notification of home care needs/orders.

## 2025-05-30 NOTE — PLAN OF CARE
Problem: Safety - Adult  Goal: Free from fall injury  5/30/2025 0026 by Dayanara Prescott RN  Outcome: Progressing  Note: Pt will remain free from falls throughout hospital stay. Fall precautions in place, bed alarm on, bed in lowest position with wheels locked and side rails 2/4 up. Room door open and hourly rounding completed. Will continue to monitor throughout shift.      Problem: Pain  Goal: Verbalizes/displays adequate comfort level or baseline comfort level  5/30/2025 0026 by Dayanara Prescott RN  Outcome: Progressing  Note: Pt will be satisfied with pain control. Pt uses numeric pain rating scale with reassessments after pain med administration. Will continue to monitor progression throughout shift.      Problem: Chronic Conditions and Co-morbidities  Goal: Patient's chronic conditions and co-morbidity symptoms are monitored and maintained or improved  5/30/2025 0026 by Dayanara Prescott RN  Outcome: Progressing   CHF Care Plan      Patient's EF (Ejection Fraction) no echo on file    Heart Failure Medications:  Diuretics:: None    (One of the following REQUIRED for EF </= 40%/SYSTOLIC FAILURE but MAY be used in EF% >40%/DIASTOLIC FAILURE)        ACE:: None        ARB:: None         ARNI:: None    (Beta Blockers)  NON- Evidenced Based Beta Blocker (for EF% >40%/DIASTOLIC FAILURE): None    Evidenced Based Beta Blocker::(REQUIRED for EF% <40%/SYSTOLIC FAILURE) None  ...................................................................................................................................................    Failed to redirect to the Timeline version of the [employer] SmartLink.      Patient's weights and intake/output reviewed    Daily Weight log at bedside, patient/family participation in use of log: \"yes    Patient's current weight today shows a difference of 1 lbs more than last documented weight.      Intake/Output Summary (Last 24 hours) at 5/30/2025 0026  Last data filed at 5/29/2025 1200  Gross per 24 hour

## 2025-05-31 LAB
HCT VFR BLD AUTO: 31 % (ref 40.5–52.5)
HGB BLD-MCNC: 10.2 G/DL (ref 13.5–17.5)

## 2025-05-31 PROCEDURE — 6370000000 HC RX 637 (ALT 250 FOR IP): Performed by: STUDENT IN AN ORGANIZED HEALTH CARE EDUCATION/TRAINING PROGRAM

## 2025-05-31 PROCEDURE — 2700000000 HC OXYGEN THERAPY PER DAY

## 2025-05-31 PROCEDURE — 85014 HEMATOCRIT: CPT

## 2025-05-31 PROCEDURE — 2500000003 HC RX 250 WO HCPCS: Performed by: STUDENT IN AN ORGANIZED HEALTH CARE EDUCATION/TRAINING PROGRAM

## 2025-05-31 PROCEDURE — 2060000000 HC ICU INTERMEDIATE R&B

## 2025-05-31 PROCEDURE — 94761 N-INVAS EAR/PLS OXIMETRY MLT: CPT

## 2025-05-31 PROCEDURE — 36415 COLL VENOUS BLD VENIPUNCTURE: CPT

## 2025-05-31 PROCEDURE — 85018 HEMOGLOBIN: CPT

## 2025-05-31 RX ORDER — CARVEDILOL 12.5 MG/1
12.5 TABLET ORAL 2 TIMES DAILY WITH MEALS
Qty: 60 TABLET | Refills: 0 | Status: SHIPPED | OUTPATIENT
Start: 2025-05-31 | End: 2025-06-30

## 2025-05-31 RX ORDER — EPLERENONE 50 MG/1
25 TABLET ORAL DAILY
Qty: 15 TABLET | Refills: 0 | Status: SHIPPED | OUTPATIENT
Start: 2025-05-31 | End: 2025-06-30

## 2025-05-31 RX ORDER — MEGESTROL ACETATE 20 MG/1
20 TABLET ORAL DAILY
Qty: 30 TABLET | Refills: 3 | Status: SHIPPED | OUTPATIENT
Start: 2025-05-31

## 2025-05-31 RX ORDER — PANTOPRAZOLE SODIUM 40 MG/1
40 TABLET, DELAYED RELEASE ORAL
Qty: 30 TABLET | Refills: 3 | Status: SHIPPED | OUTPATIENT
Start: 2025-06-01

## 2025-05-31 RX ADMIN — Medication 10 ML: at 09:03

## 2025-05-31 RX ADMIN — OXYMETAZOLINE HYDROCHLORIDE 2 SPRAY: 0.5 SPRAY NASAL at 20:31

## 2025-05-31 RX ADMIN — OXYMETAZOLINE HYDROCHLORIDE 2 SPRAY: 0.5 SPRAY NASAL at 09:06

## 2025-05-31 RX ADMIN — Medication 10 ML: at 20:30

## 2025-05-31 RX ADMIN — SALINE NASAL SPRAY 1 SPRAY: 1.5 SOLUTION NASAL at 09:03

## 2025-05-31 RX ADMIN — SACUBITRIL AND VALSARTAN 1 TABLET: 24; 26 TABLET, FILM COATED ORAL at 20:29

## 2025-05-31 RX ADMIN — PANTOPRAZOLE SODIUM 40 MG: 40 TABLET, DELAYED RELEASE ORAL at 06:49

## 2025-05-31 RX ADMIN — SALINE NASAL SPRAY 1 SPRAY: 1.5 SOLUTION NASAL at 20:29

## 2025-05-31 RX ADMIN — ESCITALOPRAM OXALATE 10 MG: 10 TABLET ORAL at 09:03

## 2025-05-31 RX ADMIN — SACUBITRIL AND VALSARTAN 1 TABLET: 24; 26 TABLET, FILM COATED ORAL at 09:03

## 2025-05-31 RX ADMIN — ROSUVASTATIN CALCIUM 40 MG: 10 TABLET, FILM COATED ORAL at 20:29

## 2025-05-31 ASSESSMENT — PAIN SCALES - GENERAL: PAINLEVEL_OUTOF10: 0

## 2025-05-31 NOTE — DISCHARGE SUMMARY
V2.0  Discharge Summary/progress note  Consider this as  a progress not if patient is not discharged  Name:  Dionisio Child /Age/Sex: 1945 (79 y.o. male)   Admit Date: 2025  Discharge Date: 25    MRN & CSN:  [id number] & [id number] Encounter Date and Time 25 11:41 AM EDT    Attending:  Opal Hutchins MD Discharging Provider: Opal Hutchins MD       Hospital Course:     Brief HPI: Dionisio Child is a 79 y.o. male who presents with generalized weakness debility dyspnea on exertion.  Failure identified.  Symptoms have been going on for the last 2 weeks it got worse to the point where the patient was brought to the hospital to get checked patient feels extremely pale and mildly winded.  Has not noticed any bleeding from anywhere patient has been noticing fatigue weight loss and lightheadedness with around 40 pound weight loss because of not eating really well for some time.     Brief Problem Based Course:   Acute on chronic anemia -underwent EGD, colonoscopy and capsule endoscopy with 1 AV malformation noted nonbleeding, no obvious source of GI blood loss, switch IV Protonix to p.o. 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  [facility]  Currently on Xtandi (enzalutamide)  Follow-up with urology in the upcoming week        HyperLipidemia -resume statin    Non-ischemic Cardiomyopathy  Biventricular ICD in place  CHF NYHA class II- chronic systolic w/ reduced EF 40% by Echo dated 2025.  Likely due to

## 2025-05-31 NOTE — PLAN OF CARE
CHF Care Plan      Patient's EF (Ejection Fraction) is no echo on file    Heart Failure Medications:  Diuretics:: None    (One of the following REQUIRED for EF </= 40%/SYSTOLIC FAILURE but MAY be used in EF% >40%/DIASTOLIC FAILURE)        ACE:: None        ARB:: None         ARNI:: Sacubitril/Valsartan-Entresto    (Beta Blockers)  NON- Evidenced Based Beta Blocker (for EF% >40%/DIASTOLIC FAILURE): None    Evidenced Based Beta Blocker::(REQUIRED for EF% <40%/SYSTOLIC FAILURE) Carvedilol- Coreg  ...................................................................................................................................................    Failed to redirect to the Timeline version of the [employer] SmartLink.      Patient's weights and intake/output reviewed    Daily Weight log at bedside, patient/family participation in use of log: \"yes    Patient's current weight today shows a difference of 2 lbs less than last documented weight.      Intake/Output Summary (Last 24 hours) at 5/31/2025 0653  Last data filed at 5/31/2025 0646  Gross per 24 hour   Intake 50 ml   Output 750 ml   Net -700 ml       Education Booklet Provided: yes    Comorbidities Reviewed Yes    Patient has a past medical history of Cancer (HCC), Irregular heart rhythm, and Unspecified cerebral artery occlusion with cerebral infarction.     >>For CHF and Comorbidity documentation on Education Time and Topics, please see Education Tab      CHF Education    Learners:  Patient and Significant Other  Readineess:   Acceptance  Method:   Explanation and Handout  Response:   Verbalizes Understanding and Needs Reinforcement  Comments: patients wife demonstrates understanding. Patient is a&ox1 requiring reinforcement.    Time Spent: 5 minutes      Pt sitting in bed at this time on  2 L O2. Pt denies shortness of breath. Pt without lower extremity edema.     Patient and/or Family's stated Goal of Care this Admission: better understand heart failure and disease

## 2025-05-31 NOTE — DISCHARGE INSTR - COC
Continuity of Care Form    Patient Name: Dionisio Child   :  1945  MRN:  [id number]    Admit date:  2025  Discharge date:  25    Code Status Order: Full Code   Advance Directives:    Date/Time Healthcare Directive Type of Healthcare Directive Copy in Chart Healthcare Agent Appointed Healthcare Agent's Name Healthcare Agent's Phone Number    25 1135 Yes, patient has an advance directive for healthcare treatment  Durable power of  for health care;Living will  --  --  --  --     25 1244 Yes, patient has an advance directive for healthcare treatment  Living will  --  --  --  --             Admitting Physician:  Tracee Conrad MD  PCP: Dora Garza MD    Discharging Nurse: ***  Discharging Hospital Unit/Room#: 0205/0205-01  Discharging Unit Phone Number: ***    Emergency Contact:   Extended Emergency Contact Information  Primary Emergency Contact: ChildBowenXenia ANG  Address: [address] [place] [id number] [facility]  Home Phone: [phone]  Mobile Phone: [phone]  Relation: Spouse  Secondary Emergency Contact: Heather Child  Home Phone: [phone]  Relation: Child    Past Surgical History:  Past Surgical History:   Procedure Laterality Date    CAPSULE ENDOSCOPY  2025    BOWEL SMALL CAPSULE ENDOSCOPY performed by Todd Adorno MD at [facility] ENDOSCOPY    CARDIAC CATHETERIZATION      COLONOSCOPY N/A 2025    COLONOSCOPY BIOPSY performed by Todd Adorno MD at [facility] ENDOSCOPY    COLONOSCOPY W/ BIOPSIES  2025    CYST REMOVAL Right 2015    pre-auricular sebaceous cyst    IR TUNNELED CVC PLACE WO SQ PORT/PUMP > 5 YEARS  2024    IR TUNNELED CATHETER PLACEMENT GREATER THAN 5 YEARS 2024 [facility] SPECIAL PROCEDURES    PACEMAKER INSERTION      PROSTATE BIOPSY      X 2    SIGMOIDOSCOPY N/A 2025    SIGMOIDOSCOPY DIAGNOSTIC FLEXIBLE performed by Todd Adorno MD at [facility] ENDOSCOPY    UPPER GASTROINTESTINAL

## 2025-06-01 PROCEDURE — 6370000000 HC RX 637 (ALT 250 FOR IP): Performed by: STUDENT IN AN ORGANIZED HEALTH CARE EDUCATION/TRAINING PROGRAM

## 2025-06-01 PROCEDURE — 2060000000 HC ICU INTERMEDIATE R&B

## 2025-06-01 PROCEDURE — 2500000003 HC RX 250 WO HCPCS: Performed by: STUDENT IN AN ORGANIZED HEALTH CARE EDUCATION/TRAINING PROGRAM

## 2025-06-01 RX ORDER — FOLIC ACID 1 MG/1
1 TABLET ORAL DAILY
Status: DISCONTINUED | OUTPATIENT
Start: 2025-06-01 | End: 2025-06-02 | Stop reason: HOSPADM

## 2025-06-01 RX ORDER — DABIGATRAN ETEXILATE 75 MG/1
150 CAPSULE ORAL 2 TIMES DAILY
Status: DISCONTINUED | OUTPATIENT
Start: 2025-06-01 | End: 2025-06-02 | Stop reason: HOSPADM

## 2025-06-01 RX ADMIN — ESCITALOPRAM OXALATE 10 MG: 10 TABLET ORAL at 08:47

## 2025-06-01 RX ADMIN — SACUBITRIL AND VALSARTAN 1 TABLET: 24; 26 TABLET, FILM COATED ORAL at 08:47

## 2025-06-01 RX ADMIN — FOLIC ACID 1 MG: 1 TABLET ORAL at 11:16

## 2025-06-01 RX ADMIN — CARVEDILOL 12.5 MG: 6.25 TABLET, FILM COATED ORAL at 16:53

## 2025-06-01 RX ADMIN — Medication 10 ML: at 20:37

## 2025-06-01 RX ADMIN — OXYMETAZOLINE HYDROCHLORIDE 2 SPRAY: 0.5 SPRAY NASAL at 20:36

## 2025-06-01 RX ADMIN — SACUBITRIL AND VALSARTAN 1 TABLET: 24; 26 TABLET, FILM COATED ORAL at 20:35

## 2025-06-01 RX ADMIN — DABIGATRAN ETEXILATE 150 MG: 75 CAPSULE ORAL at 11:16

## 2025-06-01 RX ADMIN — EMPAGLIFLOZIN 10 MG: 10 TABLET, FILM COATED ORAL at 11:16

## 2025-06-01 RX ADMIN — OXYMETAZOLINE HYDROCHLORIDE 2 SPRAY: 0.5 SPRAY NASAL at 08:48

## 2025-06-01 RX ADMIN — DABIGATRAN ETEXILATE 150 MG: 75 CAPSULE ORAL at 20:35

## 2025-06-01 RX ADMIN — Medication 10 ML: at 08:47

## 2025-06-01 RX ADMIN — ROSUVASTATIN CALCIUM 40 MG: 10 TABLET, FILM COATED ORAL at 20:35

## 2025-06-01 RX ADMIN — CARVEDILOL 12.5 MG: 6.25 TABLET, FILM COATED ORAL at 08:47

## 2025-06-01 RX ADMIN — PANTOPRAZOLE SODIUM 40 MG: 40 TABLET, DELAYED RELEASE ORAL at 06:51

## 2025-06-01 ASSESSMENT — PAIN SCALES - GENERAL
PAINLEVEL_OUTOF10: 0

## 2025-06-01 NOTE — PLAN OF CARE
Problem: Safety - Adult  Goal: Free from fall injury  Outcome: Progressing     Problem: Discharge Planning  Goal: Discharge to home or other facility with appropriate resources  5/31/2025 1258 by Maddy Moore, RN  Outcome: Adequate for Discharge  5/31/2025 1257 by Maddy Moore, RN  Outcome: Progressing

## 2025-06-01 NOTE — PLAN OF CARE
CHF Care Plan      Patient's EF (Ejection Fraction) is  40%    Heart Failure Medications:  Diuretics:: None    (One of the following REQUIRED for EF </= 40%/SYSTOLIC FAILURE but MAY be used in EF% >40%/DIASTOLIC FAILURE)        ACE:: None        ARB:: None         ARNI:: Sacubitril/Valsartan-Entresto    (Beta Blockers)  NON- Evidenced Based Beta Blocker (for EF% >40%/DIASTOLIC FAILURE): None    Evidenced Based Beta Blocker::(REQUIRED for EF% <40%/SYSTOLIC FAILURE) Carvedilol- Coreg  ...................................................................................................................................................    Failed to redirect to the Timeline version of the [employer] SmartLink.      Patient's weights and intake/output reviewed    Daily Weight log at bedside, patient/family participation in use of log: \"yes    Patient's current weight today shows a difference of 2 lbs less than last documented weight.      Intake/Output Summary (Last 24 hours) at 6/1/2025 0540  Last data filed at 5/31/2025 2207  Gross per 24 hour   Intake 150 ml   Output 750 ml   Net -600 ml       Education Booklet Provided: yes    Comorbidities Reviewed Yes    Patient has a past medical history of Cancer (HCC), Irregular heart rhythm, and Unspecified cerebral artery occlusion with cerebral infarction.     >>For CHF and Comorbidity documentation on Education Time and Topics, please see Education Tab      CHF Education    Learners:  Patient and Family  Readineess:   Acceptance  Method:   Explanation  Response:   Verbalizes Understanding  Comments:     Time Spent: 5 mins      Pt resting in bed at this time on  1 L O2. Pt with complaints of shortness of breath. Pt without lower extremity edema.     Patient and/or Family's stated Goal of Care this Admission: reduce shortness of breath, increase activity tolerance, better understand heart failure and disease management, be more comfortable, and reduce lower extremity edema prior to

## 2025-06-02 VITALS
WEIGHT: 151.6 LBS | TEMPERATURE: 98.1 F | SYSTOLIC BLOOD PRESSURE: 105 MMHG | HEART RATE: 68 BPM | HEIGHT: 69 IN | RESPIRATION RATE: 18 BRPM | BODY MASS INDEX: 22.45 KG/M2 | OXYGEN SATURATION: 95 % | DIASTOLIC BLOOD PRESSURE: 60 MMHG

## 2025-06-02 PROCEDURE — 97110 THERAPEUTIC EXERCISES: CPT

## 2025-06-02 PROCEDURE — 6370000000 HC RX 637 (ALT 250 FOR IP): Performed by: STUDENT IN AN ORGANIZED HEALTH CARE EDUCATION/TRAINING PROGRAM

## 2025-06-02 PROCEDURE — 97116 GAIT TRAINING THERAPY: CPT

## 2025-06-02 PROCEDURE — 2500000003 HC RX 250 WO HCPCS: Performed by: STUDENT IN AN ORGANIZED HEALTH CARE EDUCATION/TRAINING PROGRAM

## 2025-06-02 PROCEDURE — 94761 N-INVAS EAR/PLS OXIMETRY MLT: CPT

## 2025-06-02 PROCEDURE — 97530 THERAPEUTIC ACTIVITIES: CPT

## 2025-06-02 PROCEDURE — 2700000000 HC OXYGEN THERAPY PER DAY

## 2025-06-02 RX ADMIN — ESCITALOPRAM OXALATE 10 MG: 10 TABLET ORAL at 09:20

## 2025-06-02 RX ADMIN — Medication 10 ML: at 09:25

## 2025-06-02 RX ADMIN — CARVEDILOL 12.5 MG: 6.25 TABLET, FILM COATED ORAL at 09:20

## 2025-06-02 RX ADMIN — EMPAGLIFLOZIN 10 MG: 10 TABLET, FILM COATED ORAL at 09:20

## 2025-06-02 RX ADMIN — DABIGATRAN ETEXILATE 150 MG: 75 CAPSULE ORAL at 09:21

## 2025-06-02 RX ADMIN — FOLIC ACID 1 MG: 1 TABLET ORAL at 09:20

## 2025-06-02 RX ADMIN — SACUBITRIL AND VALSARTAN 1 TABLET: 24; 26 TABLET, FILM COATED ORAL at 09:20

## 2025-06-02 RX ADMIN — PANTOPRAZOLE SODIUM 40 MG: 40 TABLET, DELAYED RELEASE ORAL at 06:20

## 2025-06-02 ASSESSMENT — PAIN SCALES - GENERAL: PAINLEVEL_OUTOF10: 0

## 2025-06-02 NOTE — CARE COORDINATION
Conversation with patient and wife at bedside. They prefer that we use his [facility] for Home Care needs.  No preference on HC agency. [facility] can accept.  Call to [employer] rep for O2 and RW equipment. Jamila is checking on insurance criteria and WCB.  CB from [facility] ([employer]) who states that they cannot provide O2 or RW, as [facility] [employer] is primary, This writer updated patient and wife and [facility]. Fernanda will fax necessary documents to complete.  Also notified Virgil ([facility],that we will need to go with [facility]  for HC as well.  ADDENDUM: CB from Virgil - he states that [facility] is a [facility] provider. He will contact Fernanda at [facility] to coordinate.      Lupe Madison RN

## 2025-06-02 NOTE — PROGRESS NOTES
[facility] Medicine Progress Note  V 5.17      Date of Admission: 5/25/2025    Hospital Day: 5      Chief Admission Complaint:    Fatigue, Weight Loss, and Altered Mental Status (Dx with prostate ca in 2007. Had treatment. Follow up Pet scan this January showed metastatic disease and was started back on a chemo pill. Due to start radiation on June 4th. Pt has since lost 40 # since January and has stopped eating and become lethargic and confused at times.)      Subjective:  Patient seen at bedside this morning. Patient no acute concerns. No bleeding, pain anywhere, nausea, vomiting. Patient AO3, lethargic at bedside.    Presenting Admission History:       Dionisio Child is a 79 y.o. male who presents with generalized weakness debility dyspnea on exertion.  Failure identified.  Symptoms have been going on for the last 2 weeks it got worse to the point where the patient was brought to the hospital to get checked patient feels extremely pale and mildly winded.  Has not noticed any bleeding from anywhere patient has been noticing fatigue weight loss and lightheadedness with around 40 pound weight loss because of not eating really well for some time.  Patient has maroon-colored stool family 1 has not noticed any bleeding and patient has never required blood transfusions in the past     Assessment/Plan:      GI Bleed - acute lower GI Bleeding likely due to diverticular bleed or mets to the colon  Occult positive  Colonoscopy completed 5/28/25  CT A/P revealed:  1. Small left pleural effusion and basilar airspace disease.  2. Circumferential wall thickening of the rectum, proctitis versus neoplastic thickening.  3. Mild prostate enlargement.  4. Diffuse bony sclerotic lesions suggesting metastatic disease.  Patient received 2 units PRBC thus far  Continue Q8 H/H  Transfuse for Hgb < 7   Continue to hold Pradaxa  GI following, appreciate recommendations  PPI BID IV  IVF as needed    Weakness and debility most likely 2/2 
      [facility] Medicine Progress Note  V 5.17      Date of Admission: 5/25/2025    Hospital Day: 3      Chief Admission Complaint:    Fatigue, Weight Loss, and Altered Mental Status (Dx with prostate ca in 2007. Had treatment. Follow up Pet scan this January showed metastatic disease and was started back on a chemo pill. Due to start radiation on June 4th. Pt has since lost 40 # since January and has stopped eating and become lethargic and confused at times.)      Subjective:      Patient complains of wanting to get up and all the cords and cuffs in place.He appears somewhat agitated by all the mechanics on and around him.. He is less lethargic today but confused to situation and time. Wife at bedside.     Plan for EGD/Flex sig today at 1300.       Presenting Admission History:       Dionisio Child is a 79 y.o. male who presents with generalized weakness debility dyspnea on exertion.  Failure identified.  Symptoms have been going on for the last 2 weeks it got worse to the point where the patient was brought to the hospital to get checked patient feels extremely pale and mildly winded.  Has not noticed any bleeding from anywhere patient has been noticing fatigue weight loss and lightheadedness with around 40 pound weight loss because of not eating really well for some time.  Patient has maroon-colored stool family 1 has not noticed any bleeding and patient has never required blood transfusions in the past     Assessment/Plan:      GI Bleed - acute lower GI Bleeding likely due to diverticular bleed or mets to the colon.  GI consulted and appreciated - plan for EGD &sigmoidoscopy tomorrow  CT A/P revealed:  1. Small left pleural effusion and basilar airspace disease.  2. Circumferential wall thickening of the rectum, proctitis versus neoplastic thickening.  3. Mild prostate enlargement.  4. Diffuse bony sclerotic lesions suggesting metastatic disease.  Patient received 2 units PRBC thus far  Continue Q8 H/H  Transfuse 
      Hospital Medicine Progress Note  V 5.17      Date of Admission: 5/25/2025    Hospital Day: 8      Chief Admission Complaint:    Fatigue, Weight Loss, and Altered Mental Status (Dx with prostate ca in 2007. Had treatment. Follow up Pet scan this January showed metastatic disease and was started back on a chemo pill. Due to start radiation on June 4th. Pt has since lost 40 # since January and has stopped eating and become lethargic and confused at times.)      Subjective:  Patient seen at bedside this morning.  Doing well had episode of confusion this morning he did not sleep much last night. C/o mouth sores. Otherwise doing well eating somewhat better, wife at bedside plan discussed patient will need O2 to go home with    Presenting Admission History:       Dionisio Child is a 79 y.o. male who presents with generalized weakness debility dyspnea on exertion.  Failure identified.  Symptoms have been going on for the last 2 weeks it got worse to the point where the patient was brought to the hospital to get checked patient feels extremely pale and mildly winded.  Has not noticed any bleeding from anywhere patient has been noticing fatigue weight loss and lightheadedness with around 40 pound weight loss because of not eating really well for some time.  Patient has maroon-colored stool family 1 has not noticed any bleeding and patient has never required blood transfusions in the past     Assessment/Plan:      Needs home O2 set up which can be only set up tomorrow as [facility], discharge in AM once O2 set up    Acute on chronic anemia -underwent EGD, colonoscopy and capsule endoscopy with 1 AV malformation noted nonbleeding, no obvious source of GI blood loss, switch IV Protonix to p.o. 40 mg daily hemoglobin low but remained stable    Weakness and debility most likely 2/2 to GIB and underlying metastatic prostate cancer  FTT in adult  Possible severe protein calorie malnutirion  PT/OT recommends home with 
      [facility] Medicine Progress Note  V 5.17      Date of Admission: 5/25/2025    Hospital Day: 2      Chief Admission Complaint:    Fatigue, Weight Loss, and Altered Mental Status (Dx with prostate ca in 2007. Had treatment. Follow up Pet scan this January showed metastatic disease and was started back on a chemo pill. Due to start radiation on June 4th. Pt has since lost 40 # since January and has stopped eating and become lethargic and confused at times.)        Subjective:      Patient is very confused and disoriented. Very lethargic. Wife at bedside and we discuss events of the last few weeks.     Presenting Admission History:       Dionisio Child is a 79 y.o. male who presents with generalized weakness debility dyspnea on exertion.  Failure identified.  Symptoms have been going on for the last 2 weeks it got worse to the point where the patient was brought to the hospital to get checked patient feels extremely pale and mildly winded.  Has not noticed any bleeding from anywhere patient has been noticing fatigue weight loss and lightheadedness with around 40 pound weight loss because of not eating really well for some time.  Patient has maroon-colored stool family 1 has not noticed any bleeding and patient has never required blood transfusions in the past     Assessment/Plan:      GI Bleed - acute lower GI Bleeding likely due to diverticular bleed.  GI consulted and appreciated - plan for EGD &sigmoidoscopy tomorrow  Patient has received 2 units PRBC  Continue Q8 H/H  Transfuse for Hgb < 7   Occult positive  Hgb 05/26/25 7.7  Continue to hold Pradaxa  NPO at midnight     Weakness and debility most likely 2/2 to GIB and underlying cancer  FTT in adult  Possible severe protein calorie malnutirion  PT/OT to eval and treat when patient is stabilized hemodynamically   Dietitian consulted    HTN - w/known CAD and no evidence of active signs and/or symptoms of ischemia and/or failure. Currently controlled on home meds 
      [facility] Medicine Progress Note  V 5.17      Date of Admission: 5/25/2025    Hospital Day: 4      Chief Admission Complaint:    Fatigue, Weight Loss, and Altered Mental Status (Dx with prostate ca in 2007. Had treatment. Follow up Pet scan this January showed metastatic disease and was started back on a chemo pill. Due to start radiation on June 4th. Pt has since lost 40 # since January and has stopped eating and become lethargic and confused at times.)      Subjective:  Patient seen at bedside this morning. Patient no acute concerns. No bleeding, pain anywhere, nausea, vomiting. Colonoscopy pending today.    Presenting Admission History:       Dionisio Child is a 79 y.o. male who presents with generalized weakness debility dyspnea on exertion.  Failure identified.  Symptoms have been going on for the last 2 weeks it got worse to the point where the patient was brought to the hospital to get checked patient feels extremely pale and mildly winded.  Has not noticed any bleeding from anywhere patient has been noticing fatigue weight loss and lightheadedness with around 40 pound weight loss because of not eating really well for some time.  Patient has maroon-colored stool family 1 has not noticed any bleeding and patient has never required blood transfusions in the past     Assessment/Plan:      GI Bleed - acute lower GI Bleeding likely due to diverticular bleed or mets to the colon  Occult positive  CT A/P revealed:  1. Small left pleural effusion and basilar airspace disease.  2. Circumferential wall thickening of the rectum, proctitis versus neoplastic thickening.  3. Mild prostate enlargement.  4. Diffuse bony sclerotic lesions suggesting metastatic disease.  Patient received 2 units PRBC thus far  Continue Q8 H/H  Transfuse for Hgb < 7   Hgb 05/28/25 7.6  Continue to hold Pradaxa  NPO at midnight for procedure   GI following, appreciate recommendations  PPI BID IV  IVF as needed    Weakness and debility most 
    Pt Name: Dionisio Child  Medical Record Number: [id number]  Date of Birth 1945   Today's Date: 5/28/2025      Subjective:  Awake in bed, family at bedside. Denies pain.     ROS: Constitutional: No fever    Vitals:  Vitals:    05/27/25 1915 05/27/25 2346 05/28/25 0347 05/28/25 0449   BP: (!) 123/57 (!) 109/58 123/64    Pulse: 80 84 91    Resp: 17 18 17    Temp: 98.1 °F (36.7 °C) 98.2 °F (36.8 °C) 98.8 °F (37.1 °C)    TempSrc: Oral Oral Oral    SpO2: 93% 91% 93%    Weight:    71.6 kg (157 lb 13.6 oz)   Height:         I/O last 3 completed shifts:  In: 0   Out: 2000 [Urine:2000]    Exam:  General: Awake, oriented, no acute distress  Respiratory: Nonlabored breathing  Abdomen: Soft, non-tender, non-distended, no masses  : spontaneously voiding  Skin: Skin color, texture, turgor normal, no rashes or lesions  Neurologic: no gross deficits    CURRENT MEDICATIONS   Scheduled Meds:   potassium phosphate IVPB (PERIPHERAL LINE)  30 mmol IntraVENous Once    pantoprazole  40 mg IntraVENous BID    escitalopram  10 mg Oral Daily    sodium chloride flush  5-40 mL IntraVENous 2 times per day    rosuvastatin  40 mg Oral Nightly     Continuous Infusions:   sodium chloride      sodium chloride      sodium chloride      lactated ringers 100 mL/hr at 05/28/25 0127     PRN Meds:.sodium chloride, prochlorperazine **OR** prochlorperazine, oxyCODONE-acetaminophen, sodium chloride, sodium chloride, sodium chloride flush, sodium chloride, potassium chloride **OR** potassium alternative oral replacement **OR** potassium chloride, magnesium sulfate, polyethylene glycol, acetaminophen **OR** acetaminophen    LABS     Recent Labs     05/25/25  1658 05/26/25  0131 05/26/25  0701 05/26/25  1557 05/27/25  0423 05/27/25  1537 05/27/25  2328 05/28/25  0427   WBC 8.0  --  8.5   < > 8.1  --  8.5 8.4   HGB 7.1*   < > 7.7*   < > 7.6* 7.4* 7.9* 8.2*   HCT 20.8*   < > 23.2*   < > 22.6* 22.1* 23.5* 24.3*   *  --  86*   < > 80*  --  66* 69* 
  Physician Progress Note      PATIENT:               JESSICA DOE  CSN #:                  [id number]  :                       1945  ADMIT DATE:       2025 4:20 PM  DISCH DATE:  RESPONDING  PROVIDER #:        Milka Mccurdy MD          QUERY TEXT:    Anemia is documented in the medical record . Please specify the type:    The clinical indicators include:  H&P-\" Generalized weakness and debility with concerns for GI bleed.    Hemoglobin is 7.1 transfuse to keep the hemoglobin at least close to 8.  1   pack of RBC has been ordered.\"    -IM-- \"GI Bleed - acute lower GI Bleeding likely due to diverticular bleed   or mets to the colon  Occult positive--Patient received 2 units PRBC thus far Continue Q8 H/H.\"    Hgb trend-- 7.1, 6.7, 8.3. ---occult blood stool positive    2uprbc's, plavix held, gi/urology consults, labs, EGD,Colonoscopy, CT,   essential home meds, supportive care  Options provided:  -- Related to acute blood loss  -- Related to acute on chronic blood loss  -- Other - I will add my own diagnosis  -- Disagree - Not applicable / Not valid  -- Disagree - Clinically unable to determine / Unknown  -- Refer to Clinical Documentation Reviewer    PROVIDER RESPONSE TEXT:    The patient?s anemia is related to acute on chronic blood loss.    Query created by: Maximo Felder on 2025 11:33 AM      Electronically signed by:  Milka Mccurdy MD 2025 1:48 PM          
  Speech Language Pathology  Clinical Bedside Swallow Assessment  Facility/Department: [facility] A2 CARD TELEMETRY        Recommendations:  Diet recommendation: IDDSI 7 Regular Solids; IDDSI 0 Thin Liquids; Meds PO as tolerated  **once cleared by MD; currently on full liquid diet  Instrumentation: Not clinically indicated at this time  Risk management: upright for all intake, alternate bites/sips, and slow rate of intake      NAME:iDonisio Child  : 1945 (79 y.o.)   MRN: [id number]  ROOM: [address] [place] [address]  ADMISSION DATE: 2025  PATIENT DIAGNOSIS(ES): Hypokalemia [E87.6]  Prostate cancer (HCC) [C61]  GIB (gastrointestinal bleeding) [K92.2]  General weakness [R53.1]  Failure to thrive in adult [R62.7]  Altered mental status, unspecified altered mental status type [R41.82]  Gastrointestinal hemorrhage, unspecified gastrointestinal hemorrhage type [K92.2]  Metastatic malignant neoplasm, unspecified site (HCC) [C79.9]  Hypotension due to hypovolemia [E86.1]  Chief Complaint   Patient presents with    Fatigue    Weight Loss    Altered Mental Status     Dx with prostate ca in . Had treatment. Follow up Pet scan this January showed metastatic disease and was started back on a chemo pill. Due to start radiation on . Pt has since lost 40 # since January and has stopped eating and become lethargic and confused at times.     Patient Active Problem List    Diagnosis Date Noted    GIB (gastrointestinal bleeding) 2025    Left knee pain 2017     Past Medical History:   Diagnosis Date    Cancer (HCC)     prostate    Irregular heart rhythm     Unspecified cerebral artery occlusion with cerebral infarction     no residual     Past Surgical History:   Procedure Laterality Date    CARDIAC CATHETERIZATION      CYST REMOVAL Right 4/17/15    pre-auricular sebaceous cyst    IR TUNNELED CVC PLACE WO SQ PORT/PUMP > 5 YEARS  2024    IR TUNNELED CATHETER PLACEMENT GREATER THAN 5 YEARS 2024 [facility] 
Comprehensive Nutrition Assessment    Type and Reason for Visit:  Reassess    Nutrition Recommendations/Plan:   Continue general diet   Add Ensure TID and Magic Cups BID - prefers vanilla   Encourage PO intakes as tolerated   May benefit from appetite stimulant - MD to advise   Monitor nutrition adequacy, pertinent labs, bowel habits, wt changes, and clinical progress     Malnutrition Assessment:  Malnutrition Status:  Severe malnutrition (05/29/25 1443)    Context:  Chronic Illness     Findings of the 6 clinical characteristics of malnutrition:  Energy Intake:  75% or less estimated energy requirements for 1 month or longer  Weight Loss:  Unable to assess     Body Fat Loss:  Severe body fat loss Orbital, Buccal region   Muscle Mass Loss:  Severe muscle mass loss Temples (temporalis)  Fluid Accumulation:  Unable to assess     Strength:  Not Performed    Nutrition Assessment:    Follow up: S/p EDG and flex sig on 5/27 with gastritis and 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 S/p colonoscopy with colitis on 5/28. Pt severely malnourished AEB weight loss, decreased appetite and muscle and fat wasting. Pt reports no appetite for a long time, likely since January. Reports minimal intakes over past several months. Often goes days without noticing he is hungry. Currently ordered general diet with PO intakes of 0-25%. Discussed importance of adeqaute nutrition here and at home. Refused all of lunch this afternoon. Reports has eaten applesauce and has a little juice today. Agreeable to adding ONS, pt normally drinks ONS at home. Discussed high protein, high calorie diet for home. Will continue to monitor.    Nutrition Related Findings:    Active BS. BM 5/29. Labs reviewed. Frail. Wound Type: None       Current Nutrition Intake & Therapies:    Average Meal Intake: 0%, 1-25%  Average Supplements Intake: None Ordered  ADULT DIET; Regular    Anthropometric Measures:  Height: 175.3 cm (5' 9\")  Ideal Body Weight (IBW): 160 lbs 
Comprehensive Nutrition Assessment    Type and Reason for Visit:  Reassess    Nutrition Recommendations/Plan:   Continue general diet   Modify ONS to Ensure Clear with meals - monitor acceptance   Encourage PO intakes as tolerated   Monitor nutrition adequacy, pertinent labs, bowel habits, wt changes, and clinical progress     Malnutrition Assessment:  Malnutrition Status:  Severe malnutrition (05/29/25 1443)    Context:  Chronic Illness     Findings of the 6 clinical characteristics of malnutrition:  Energy Intake:  75% or less estimated energy requirements for 1 month or longer  Weight Loss:  Unable to assess     Body Fat Loss:  Severe body fat loss Orbital, Buccal region   Muscle Mass Loss:  Severe muscle mass loss Temples (temporalis)  Fluid Accumulation:  Unable to assess     Strength:  Not Performed    Nutrition Assessment:    Follow up: S/p capsule endoscopy on 5/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 Remains nutritionally compromised AEB continued poor appetite and PO intakes. Was c/o mouth sores, reports improving. Reports taking bites of food. Unfavorable to Ensure and Magic Cups. Agreeable to trial Ensure Clears, RD to modify orders. Discussed importance of nutrition at home. Handouts on high protein, high calorie diet provided to bedside. Family reports getting appetite stimulant for home. Will continue to monitor further nutritional needs.    Nutrition Related Findings:    Active BS. BM on 6/1. Trace BLE edema. Frial. +Jardiance, may further decrease appetite. Wound Type: None       Current Nutrition Intake & Therapies:    Average Meal Intake: 0%  Average Supplements Intake: 0%  ADULT DIET; Regular  ADULT ORAL NUTRITION SUPPLEMENT; Breakfast, Lunch, Dinner; Standard High Calorie/High Protein Oral Supplement  ADULT ORAL NUTRITION SUPPLEMENT; Lunch, Dinner; Frozen Oral Supplement    Anthropometric Measures:  Height: 175.3 cm (5' 9\")  Ideal Body Weight (IBW): 160 lbs (73 kg)       Current Body Weight: 
Endo staff at bedside to explain capsule endoscopy to patient along with diet modifications for the duration of the study. Patient is agreeable to proceed. Patient swallowed capsule without difficulty. Capsule endoscopy belt and straps were comfortably secured around patient. Diet instructions were reviewed with patient and nurse. (NPO for 2 hours, clear liquids OK 2 hours after start of study, light meal OK 4 hours after start of study.) Endo staff will retrieve belt after completion of study.    
GI consult placed  
Gurpreet Mccurdy MD, \"FLuids are ordered and stopped this morning by RN, BNP [id number] this am, Do you still want these ordered? Or lower dose with CHF? MG is 1.74, the PRN is to give under 1.6. DO you want him to have any? Thanks.\" @8976.   
Occupational Therapy Attempt    OT orders received, chart reviewed, and pt off floor at this time. Will follow-up as therapy schedule and patient schedule permits.     Becky Garcia S/OT  [employer] OTR/L  
Oriented pt to call light  (in patients reach)wife at bedside  Bed position is in low   Side rails up X 2 /gurney locked   
Oxygen documentation:    O2 saturation at REST on ROOM AIR = ___89___%    If saturation is 89% or above please proceed with steps 2 and 3……….    O2 saturation with AMBULATION of 20___ feet on ROOM AIR = _82____%  O2 saturation with AMBULATION on 4 Liter/min = 94%    DCP notified: ______   
Patient admitted to room 204 from ED.  Patient oriented to room, call light, bed rails, phone, lights and bathroom.  Patient instructed about the schedule of the day including: vital sign frequency, lab draws, possible tests, frequency of MD and staff rounds, including RN/MD rounding together at bedside, daily weights, and I &O's.  Patient instructed about prescribed diet, how to use 8MENU, and television.  Bed alarm in place, patient aware of placement and reason.  Telemetry box in place, patient aware of placement and reason.  Bed locked, in lowest position, side rails up 2/4, call light within reach.  Will continue to monitor.     
Patient received 2x tap water enemas per order. Report given to endoscopy staff. CMU notified. Vital signs stable. Patient transported to [facility].     Vitals:    05/27/25 1200   BP: 105/62   Pulse: 82   Resp: 16   Temp: 98.6 °F (37 °C)   SpO2: 94%      
Patient returned from endoscopy.     Vitals:    05/27/25 1415   BP: 114/61   Pulse: 80   Resp: 16   Temp: 97.7 °F (36.5 °C)   SpO2: 95%      
Patient returned from endoscopy. Handover received from DEE Trejo.     Vitals:    05/28/25 1415   BP: 122/64   Pulse: 83   Resp: 16   Temp: 98.4 °F (36.9 °C)   SpO2: 96%      
Patient transferred for colonoscopy. Report given to DEE Majano.     Vitals:    05/28/25 0855   BP: (!) 111/53   Pulse: 95   Resp: 18   Temp: 98.4 °F (36.9 °C)   SpO2: 92%      
Patient transported back to floor in stable condition at this time.   
Patient with stable VS, denies pian, denies nausea. Family at bedside and aware of plan of care. Report called to floor and awaiting transport back to room.  
Phase II:  1.  Patient is identified using name and the date of birth.  2.  The patient is free from signs and symptoms of chemical, electrical, laser, radiation, positioning, or transfer/transport injury.  3.  The patient receives appropriate medication(s), safely administered during the Perioperative period.  4.  The patient has wound/tissue perfusion consistent with or improved from baseline levels established preoperatively.  5.  The patient is at or returning to normothermia at the conclusion of the immediate postoperative period.  6.  The patient's fluid, electrolyte, and acid base balances are consistent with or improved from baseline levels established preoperatively.  7.  The patient's pulmonary function is consistent with or improved from baseline levels established preoperatively.  8.  The patient's cardiovascular status is consistent with or improved from baseline levels established preoperatively.  9.  The patient/caregiver demonstrates knowledge of nutritional management related to the operative or other invasive procedure.  10.  The patient/caregiver demonstrates knowledge of medication, pain, and wound management.  11.  The patient participates in the rehabilitation process as applicable.  12.  The patient/caregiver participates in decisions affection his or her Perioperative plan of care.  13.  The patient's care is consistent with the individualized Perioperative plan of care.  14.  The patient's right to privacy is maintained.  15.  The patient is the recipient of competent and ethical care within legal standards of practice.  16.  The patient's value system, lifestyle, ethnicity, and culture are considered, respected, and incorporated in the Perioperative plan of care and understands special services available.  17.  The patient demonstrates and/or reports adequate pain control throughout the the Perioperative period.  18.  The patient's neurological status is consistent with or improved from 
Physical Therapy    PT order received, chart reviewed.  PT evaluation attempted this afternoon, although pt currently off floor for EGD.  Will re-attempt PT evaluation on 5/28/25.    Thank you.    Lucila Laughlin  PT, DPT #[id number]      
Physical Therapy    Therapy orders received and Pt chart reviewed. Attempted to see pt this morning. Pt off floor per RN report for medical procedure.   Will re-attempt 5/29/25 or medically appropriate.    Opal Reagan, PT        
Physical Therapy  Facility/Department: [facility] A2 CARD TELEMETRY  Daily Treatment Note  NAME: Dionisio Child  : 1945  MRN: [id number]    Date of Service: 2025    Discharge Recommendations:  24 hour supervision or assist, Home with Home health PT   PT Equipment Recommendations  Equipment Needed: Yes  Mobility Devices: Walker  Walker: Rolling    Patient Diagnosis(es): The primary encounter diagnosis was Gastrointestinal hemorrhage, unspecified gastrointestinal hemorrhage type. Diagnoses of Altered mental status, unspecified altered mental status type, General weakness, Failure to thrive in adult, Prostate cancer (HCC), Metastatic malignant neoplasm, unspecified site (HCC), Hypotension due to hypovolemia, Hypokalemia, Anemia, unspecified type, Congestive heart failure, unspecified HF chronicity, unspecified heart failure type (HCC), and Shortness of breath were also pertinent to this visit.    Assessment  Assessment: Pt demo improved mobility with use of RW vs without AD during last session. Pt continues to be limited by endurance and overall feeling of fatigue. Pt on 1 L O2 throughout session. Pt would benefit from continued skilled PT to address remaining deficits. Recommed home with 24hr supervision, HHPT and RW upon d/c. Pt would benefit from RW to reduce risk for falls and in order to perform household tasks.  Activity Tolerance: Patient tolerated treatment well;Patient limited by endurance  Equipment Needed: Yes  Mobility Devices: Walker    Plan  Physical Therapy Plan  General Plan: 3-5 times per week  Current Treatment Recommendations: Strengthening;Balance training;Functional mobility training;Transfer training;Endurance training;Gait training;Stair training;Neuromuscular re-education;Home exercise program;Safety education & training;Patient/Caregiver education & training;Equipment evaluation, education, & procurement;Positioning;Therapeutic 
Pill cam machine removed as instructed  
Pt dc into the care of his wife and his son. Pt wheeled down stairs into POV via PCA. pTS pivs removed and tele removed and places in dirty bin. CMU called and notified. All questions answered and pts wife informed to get dc medications from the [facility], [place] [facility] RN asked would you like me to page the DR and see about having these changed pts spouse stated \" no that's okay, [facility] just get it at [facility] , I'm ready to go hiome and dont want to wait longer\" RN stated it wouldn't take to long but pts spouse stated its fine ill get it from the [facility]   
Pt had loose BM; Pericare given and linens changed  
Pt incontinent of stool upon arrival.  Perineal care provided.  CMU confirmed tele box 3.   
Pt transferred back to room on O2@2l/nc per tech.Cardiac monitor in place  
Report called to floor nurse  
Urology consulted  
Wife at bedside and asking for something for pts anxiety and agitation. Cross cover paged and one time dose PO Seroquel ordered and administered at this time.   
--  6*  --  6* 6*  --    CREATININE 0.5*  --  0.4*  --  0.5* 0.5*  --    MG 1.66*  --  1.74*  --   --  2.10  --    PHOS 2.8  --  2.3*  --   --  2.7  --    CALCIUM 8.3  --  8.4  --  8.3 8.1*  --    AST 25  --   --   --   --   --   --    ALT 9*  --   --   --   --   --   --    BILITOT 0.8  --   --   --   --   --   --    BILIDIR 0.5*  --   --   --   --   --   --     < > = values in this interval not displayed.       ASSESSMENT   Hospital day # 4  79y.o. male with fatigue, weakness. HGB 6.7, received 2 units PRBC. Consulted for history of prostate cancer.   [place] 9 prostate cancer on xtandi and is to start Pluvicto next week.   Underwent EGD 5/27/25, colonoscopy 5/28/25 with no obvious signs of bleeding  PLAN   Keep appt with Dr. Jay on Monday.  Discussed findings with Dr. Martinez and his team will reach out to the patient as he is likely no longer a candidate for the initiation of Pluvicto treatment next week.   Palliative care consult    Urology will sign off, call with questions.     Magda Kumari, MEHUL - CNP 5/29/2025     
support;Poor (Fair to poor at times, able to stand to urinate but with movement req to grab onto grab bars in bathroom)  Transfer Training  Transfer Training: Yes  Overall Level of Assistance: Minimal assistance  Interventions: Verbal cues;Safety awareness training  Sit to Stand: Minimal assistance (Recliner to standing w/o AD)  Stand to Sit: Contact guard assistance  Gait  Gait Training: Yes  Overall Level of Assistance: Minimal assistance  Distance (ft): 15 Feet (15+15)  Assistive Device: Gait belt;Other (comment) (HHA and use of IV pole)  Interventions: Verbal cues;Safety awareness training;Weight shifting training/pressure relief  Base of Support: Narrowed  Speed/Alice: Fluctuations  Step Length: Left shortened;Right shortened  Stance: Left decreased;Right decreased  Gait Abnormalities: Path deviations;Trunk sway increased;Decreased step clearance (Pt unsteady w/o AD, more steady w/ HHA, increased med-lat sway and some path deviations, assist given for line management, pt is slightly impulsive w/ ambulation)     AROM: Generally decreased, functional  Strength: Generally decreased, functional  Coordination: Generally decreased, functional  Tone: Normal  Sensation: Intact  ADL  Putting On/Taking Off Footwear: Stand by assistance  Putting On/Taking Off Footwear Skilled Clinical Factors: seated in recliner to don/doff socks via figure 4  Toileting: Minimal assistance  Toileting Skilled Clinical Factors: min A for balance in standing at toilet to urinate  Functional Mobility: Minimal assistance  Functional Mobility Skilled Clinical Factors: grossly CGA for mobility to/from bathroom w/ HHA, several stumbles/small LOB requiring min A to regain balance  Product Used : Soap and water     Activity Tolerance  Activity Tolerance: Patient tolerated evaluation without incident;Patient tolerated treatment well;Patient limited by fatigue;Patient limited by endurance        Vision  Vision: Impaired  Vision Exceptions: Wears 
ordered  [] Collateral history obtained     [] Independent Interpretation of tests (any 1)    [] Telemetry (Rhythm Strip) personally reviewed and interpreted        [] Imaging personally reviewed and interpreted     [x] Discussion (any 1)  [x] Multi-Disciplinary Rounds with Case Management  [] Discussed management of the case with           Labs:  Personally reviewed on 5/30/2025 and interpreted for clinical significance as documented above.     Recent Labs     05/28/25 2156 05/29/25 0422 05/29/25 0721 05/30/25 0421   WBC 7.8 8.0  --  7.8   HGB 7.9* 7.7* 8.3* 8.0*   HCT 23.4* 22.6* 24.7* 23.9*   PLT 63* 64*  --  67*     Recent Labs     05/28/25 0427 05/28/25 2156 05/29/25 0422 05/30/25 0421    143 144 144   K 3.0* 3.3* 3.7 3.5    104 105 105   CO2 23 25 26 26   BUN 6* 6* 6* 7   CREATININE 0.4* 0.5* 0.5* 0.5*   CALCIUM 8.4 8.3 8.1* 8.0*   MG 1.74*  --  2.10  --    PHOS 2.3*  --  2.7  --      Recent Labs     05/28/25 0427   PROBNP 5,615*     No results for input(s): \"LABA1C\" in the last 72 hours.  No results for input(s): \"AST\", \"ALT\", \"BILIDIR\", \"BILITOT\", \"ALKPHOS\" in the last 72 hours.    No results for input(s): \"INR\", \"LACTA\", \"TSH\" in the last 72 hours.      Urine Cultures: No results found for: \"LABURIN\"  Blood Cultures:   Lab Results   Component Value Date/Time    BC No Growth after 4 days of incubation. 05/25/2025 05:31 PM     Lab Results   Component Value Date/Time    BLOODCULT2 No Growth after 4 days of incubation. 05/25/2025 05:31 PM     Organism: No results found for: \"ORG\"      Opal Hutchins MD   
relief  Base of Support: Narrowed  Speed/Alice: Fluctuations  Step Length: Left shortened;Right shortened  Stance: Left decreased;Right decreased  Gait Abnormalities: Path deviations;Trunk sway increased;Decreased step clearance (Pt unsteady w/o AD, more steady w/ HHA, increased med-lat sway and some path deviations, assist given for line management, pt is slightly impulsive w/ ambulation)                                                                     AM-PAC - Mobility    AM-PAC Basic Mobility - Inpatient   How much help is needed turning from your back to your side while in a flat bed without using bedrails?: A Little  How much help is needed moving from lying on your back to sitting on the side of a flat bed without using bedrails?: A Little  How much help is needed moving to and from a bed to a chair?: A Little  How much help is needed standing up from a chair using your arms?: A Little  How much help is needed walking in hospital room?: A Little  How much help is needed climbing 3-5 steps with a railing?: A Lot  AM-PAC Inpatient Mobility Raw Score : 17  AM-PAC Inpatient T-Scale Score : 42.13  Mobility Inpatient CMS 0-100% Score: 50.57  Mobility Inpatient CMS G-Code Modifier : CK          Goals  Short Term Goals  Time Frame for Short Term Goals: 7 Days (6/5)  Short Term Goal 1: Pt will demo bed mobility Mod Ind  Short Term Goal 2: Pt will demo functional transfers w/ LRAD and SBA  Short Term Goal 3: Pt will ambulate 100ft w/ LRAD w/ CGA  Short Term Goal 4: Pt will demo 10-12 reps of 2-3 BLE exercises by 6/1  Patient Goals   Patient Goals : \"To go Home\"       Education  Patient Education  Education Given To: Patient;Family  Education Provided: Role of Therapy;Plan of Care;Transfer Training;Mobility Training;Family Education;Fall Prevention Strategies  Education Provided Comments: Pt edu on OOB mobility, generalized safety while in the hospital, safety w/ transfers and ambulation, AD management, prevention

## 2025-06-02 NOTE — CARE COORDINATION
CASE MANAGEMENT DISCHARGE SUMMARY      Discharge to: [facility] with souse and [facility]          New Durable Medical Equipment ordered/agency: RW  -[employer]  Home O2 [facility]    Transportation:    Family/car: wife here to transport        Confirmed discharge plan with:patient and wife        Facility/Agency, name:   [facility] has pulled orders from [facility]     Note: Discharging nurse to complete CLAUDIA, reconcile AVS, and place final copy with patient's discharge packet. RN to ensure that written prescriptions for  Level II medications are sent with patient to the facility as per protocol.    Lupe Madison, RN

## 2025-06-02 NOTE — CARE COORDINATION
Requested documentation regarding O2 needs faxed to Fernanda ([facility]) @[phone],  Spoke with Fernanda to confirm that she had all needed documentation.    ADDENDUM: [facility] approval from [facility] and has accepted patient.      Lupe Madison RN

## 2025-06-02 NOTE — CARE COORDINATION
This writer LM for Fernanda ([facility] liaison for HC needs) [phone]. He will need home O2, RW and HHC.    Following    Lupe Madison RN

## 2025-06-02 NOTE — DISCHARGE INSTR - DIET

## 2025-06-02 NOTE — PLAN OF CARE
CHF Care Plan      Patient's EF (Ejection Fraction) is  40%    Heart Failure Medications:  Diuretics:: None    (One of the following REQUIRED for EF </= 40%/SYSTOLIC FAILURE but MAY be used in EF% >40%/DIASTOLIC FAILURE)        ACE:: None        ARB:: None         ARNI:: Sacubitril/Valsartan-Entresto    (Beta Blockers)  NON- Evidenced Based Beta Blocker (for EF% >40%/DIASTOLIC FAILURE): None    Evidenced Based Beta Blocker::(REQUIRED for EF% <40%/SYSTOLIC FAILURE) Carvedilol- Coreg  ...................................................................................................................................................    Failed to redirect to the Timeline version of the [employer] SmartLink.      Patient's weights and intake/output reviewed    Daily Weight log at bedside, patient/family participation in use of log: \"yes    Patient's current weight today shows a difference of 3 lbs less than last documented weight.      Intake/Output Summary (Last 24 hours) at 6/2/2025 0652  Last data filed at 6/1/2025 2245  Gross per 24 hour   Intake 240 ml   Output 0 ml   Net 240 ml       Education Booklet Provided: yes    Comorbidities Reviewed Yes    Patient has a past medical history of Cancer (HCC), Irregular heart rhythm, and Unspecified cerebral artery occlusion with cerebral infarction.     >>For CHF and Comorbidity documentation on Education Time and Topics, please see Education Tab      CHF Education    Learners:  Patient and Family  Readineess:   Acceptance  Method:   Explanation  Response:   Verbalizes Understanding  Comments:     Time Spent: 10      Pt resting in bed at this time on  1 L O2. Pt with complaints of shortness of breath. Pt with nonpitting lower extremity edema.     Patient and/or Family's stated Goal of Care this Admission: reduce shortness of breath, increase activity tolerance, better understand heart failure and disease management, be more comfortable, and reduce lower extremity edema prior to

## 2025-06-03 NOTE — DISCHARGE SUMMARY
Hospital Medicine Discharge Summary    Patient: Dionisio Child   : 1945     Hospital:  [facility]  Admit Date: 2025   Discharge Date: 2025    Disposition:  Home w/ HHC   Code status:  Full  Condition at Discharge: Stable  Primary Care Provider: Dora Garza MD    Admitting Provider: No admitting provider for patient encounter.  Discharge Provider: Daquan Martin MD     Discharge Diagnoses:      Active Hospital Problems    Diagnosis     Severe malnutrition [E43]     GIB (gastrointestinal bleeding) [K92.2]        Presenting Admission History:      Dionisio Child is a 79 y.o. male who presents with generalized weakness debility dyspnea on exertion.  Failure identified.  Symptoms have been going on for the last 2 weeks it got worse to the point where the patient was brought to the hospital to get checked patient feels extremely pale and mildly winded.  Has not noticed any bleeding from anywhere patient has been noticing fatigue weight loss and lightheadedness with around 40 pound weight loss because of not eating really well for some time.  Patient has maroon-colored stool family 1 has not noticed any bleeding and patient has never required blood transfusions in the past      Assessment/Plan:      Acute on chronic anemia -underwent EGD, colonoscopy and capsule endoscopy with 1 AV malformation noted nonbleeding, no obvious source of GI blood loss, switch IV Protonix to p.o. 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  [facility]  Currently on Xtandi (enzalutamide)  Urology He is to start Pluvicto next week,     ECOG 3.     HTN - w/known CAD and no evidence of active signs and/or symptoms of ischemia and/or failure.

## 2025-06-05 ENCOUNTER — FOLLOWUP TELEPHONE ENCOUNTER (OUTPATIENT)
Dept: TELEMETRY | Age: 80
End: 2025-06-05

## 2025-06-05 NOTE — TELEPHONE ENCOUNTER
2nd Attempt; No Answer- Left HIPAA compliant voicemail with Non-Urgent Heart Failure Resource Line number for call back.     Kylee Stroud RN

## 2025-06-05 NOTE — TELEPHONE ENCOUNTER
1st Attempt; No Answer- Left HIPAA compliant voicemail with Non-Urgent Heart Failure Resource Line number for call back.     Kylee Stroud RN

## 2025-06-05 NOTE — TELEPHONE ENCOUNTER
3rd and final Attempt; No Answer- Left HIPAA compliant voicemail with Non-Urgent Heart Failure Resource Line number for call back.      Kylee Stroud RN

## (undated) DEVICE — ELECTRODE ECG MONITR FOAM TEAR DROP ADLT RED

## (undated) DEVICE — ELECTRODE,ECG,STRESS,FOAM,3PK: Brand: MEDLINE

## (undated) DEVICE — ENDOSCOPIC KIT 2 12 FT OP4 DE2 GWN SYR

## (undated) DEVICE — CONMED SCOPE SAVER BITE BLOCK, 20X27 MM: Brand: SCOPE SAVER

## (undated) DEVICE — FORCEPS BX L240CM JAW DIA2.8MM L CAP W/ NDL MIC MESH TOOTH

## (undated) DEVICE — CANNULA NSL AD TBNG L7FT PVC STR NONFLARED PRNG O2 DEL W STD

## (undated) DEVICE — ENDO CARRY-ON PROCEDURE KIT INCLUDES SUCTION TUBING, LUBRICANT, GAUZE, BIOHAZARD STICKER, TRANSPORT PAD AND INTERCEPT BEDSIDE KIT.: Brand: ENDO CARRY-ON PROCEDURE KIT